# Patient Record
Sex: FEMALE | Race: WHITE | NOT HISPANIC OR LATINO | Employment: UNEMPLOYED | URBAN - NONMETROPOLITAN AREA
[De-identification: names, ages, dates, MRNs, and addresses within clinical notes are randomized per-mention and may not be internally consistent; named-entity substitution may affect disease eponyms.]

---

## 2021-05-01 ENCOUNTER — APPOINTMENT (OUTPATIENT)
Dept: CT IMAGING | Facility: HOSPITAL | Age: 36
End: 2021-05-01

## 2021-05-01 ENCOUNTER — HOSPITAL ENCOUNTER (EMERGENCY)
Facility: HOSPITAL | Age: 36
Discharge: HOME | End: 2021-05-01
Attending: EMERGENCY MEDICINE | Admitting: EMERGENCY MEDICINE

## 2021-05-01 VITALS
DIASTOLIC BLOOD PRESSURE: 58 MMHG | BODY MASS INDEX: 25.2 KG/M2 | SYSTOLIC BLOOD PRESSURE: 91 MMHG | RESPIRATION RATE: 18 BRPM | HEIGHT: 59 IN | OXYGEN SATURATION: 100 % | TEMPERATURE: 98.6 F | WEIGHT: 125 LBS | HEART RATE: 70 BPM

## 2021-05-01 DIAGNOSIS — R10.9 ABDOMINAL PAIN, UNSPECIFIED ABDOMINAL LOCATION: ICD-10-CM

## 2021-05-01 DIAGNOSIS — N39.0 URINARY TRACT INFECTION WITHOUT HEMATURIA, SITE UNSPECIFIED: Primary | ICD-10-CM

## 2021-05-01 LAB
ALBUMIN SERPL-MCNC: 4.1 G/DL (ref 3.5–5.2)
ALBUMIN/GLOB SERPL: 1.2 G/DL
ALP SERPL-CCNC: 71 U/L (ref 39–117)
ALT SERPL W P-5'-P-CCNC: 54 U/L (ref 1–33)
ANION GAP SERPL CALCULATED.3IONS-SCNC: 11.5 MMOL/L (ref 5–15)
AST SERPL-CCNC: 40 U/L (ref 1–32)
B-HCG UR QL: NEGATIVE
BACTERIA UR QL AUTO: ABNORMAL /HPF
BASOPHILS # BLD AUTO: 0.04 10*3/MM3 (ref 0–0.2)
BASOPHILS NFR BLD AUTO: 0.5 % (ref 0–1.5)
BILIRUB SERPL-MCNC: 0.2 MG/DL (ref 0–1.2)
BILIRUB UR QL STRIP: NEGATIVE
BUN SERPL-MCNC: 13 MG/DL (ref 6–20)
BUN/CREAT SERPL: 16.3 (ref 7–25)
CALCIUM SPEC-SCNC: 9.2 MG/DL (ref 8.6–10.5)
CHLORIDE SERPL-SCNC: 101 MMOL/L (ref 98–107)
CLARITY UR: ABNORMAL
CO2 SERPL-SCNC: 25.5 MMOL/L (ref 22–29)
COLOR UR: ABNORMAL
CREAT SERPL-MCNC: 0.8 MG/DL (ref 0.57–1)
D-LACTATE SERPL-SCNC: 1.3 MMOL/L (ref 0.5–2)
DEPRECATED RDW RBC AUTO: 38.6 FL (ref 37–54)
EOSINOPHIL # BLD AUTO: 0.07 10*3/MM3 (ref 0–0.4)
EOSINOPHIL NFR BLD AUTO: 0.8 % (ref 0.3–6.2)
ERYTHROCYTE [DISTWIDTH] IN BLOOD BY AUTOMATED COUNT: 11.7 % (ref 12.3–15.4)
GFR SERPL CREATININE-BSD FRML MDRD: 82 ML/MIN/1.73
GLOBULIN UR ELPH-MCNC: 3.4 GM/DL
GLUCOSE SERPL-MCNC: 115 MG/DL (ref 65–99)
GLUCOSE UR STRIP-MCNC: NEGATIVE MG/DL
HCT VFR BLD AUTO: 42.5 % (ref 34–46.6)
HGB BLD-MCNC: 14.2 G/DL (ref 12–15.9)
HGB UR QL STRIP.AUTO: ABNORMAL
IMM GRANULOCYTES # BLD AUTO: 0.02 10*3/MM3 (ref 0–0.05)
IMM GRANULOCYTES NFR BLD AUTO: 0.2 % (ref 0–0.5)
KETONES UR QL STRIP: ABNORMAL
LEUKOCYTE ESTERASE UR QL STRIP.AUTO: ABNORMAL
LIPASE SERPL-CCNC: 37 U/L (ref 13–60)
LYMPHOCYTES # BLD AUTO: 1.2 10*3/MM3 (ref 0.7–3.1)
LYMPHOCYTES NFR BLD AUTO: 13.9 % (ref 19.6–45.3)
MCH RBC QN AUTO: 30.1 PG (ref 26.6–33)
MCHC RBC AUTO-ENTMCNC: 33.4 G/DL (ref 31.5–35.7)
MCV RBC AUTO: 90.2 FL (ref 79–97)
MONOCYTES # BLD AUTO: 0.56 10*3/MM3 (ref 0.1–0.9)
MONOCYTES NFR BLD AUTO: 6.5 % (ref 5–12)
NEUTROPHILS NFR BLD AUTO: 6.75 10*3/MM3 (ref 1.7–7)
NEUTROPHILS NFR BLD AUTO: 78.1 % (ref 42.7–76)
NITRITE UR QL STRIP: NEGATIVE
NRBC BLD AUTO-RTO: 0 /100 WBC (ref 0–0.2)
PH UR STRIP.AUTO: 6.5 [PH] (ref 5–8)
PLATELET # BLD AUTO: 263 10*3/MM3 (ref 140–450)
PMV BLD AUTO: 9.9 FL (ref 6–12)
POTASSIUM SERPL-SCNC: 4.5 MMOL/L (ref 3.5–5.2)
PROCALCITONIN SERPL-MCNC: 0.04 NG/ML (ref 0–0.25)
PROT SERPL-MCNC: 7.5 G/DL (ref 6–8.5)
PROT UR QL STRIP: ABNORMAL
RBC # BLD AUTO: 4.71 10*6/MM3 (ref 3.77–5.28)
RBC # UR: ABNORMAL /HPF
REF LAB TEST METHOD: ABNORMAL
SODIUM SERPL-SCNC: 138 MMOL/L (ref 136–145)
SP GR UR STRIP: 1.03 (ref 1–1.03)
SQUAMOUS #/AREA URNS HPF: ABNORMAL /HPF
UROBILINOGEN UR QL STRIP: ABNORMAL
WBC # BLD AUTO: 8.64 10*3/MM3 (ref 3.4–10.8)
WBC UR QL AUTO: ABNORMAL /HPF

## 2021-05-01 PROCEDURE — 25010000002 MORPHINE PER 10 MG: Performed by: EMERGENCY MEDICINE

## 2021-05-01 PROCEDURE — 25010000002 KETOROLAC TROMETHAMINE PER 15 MG: Performed by: EMERGENCY MEDICINE

## 2021-05-01 PROCEDURE — 96365 THER/PROPH/DIAG IV INF INIT: CPT

## 2021-05-01 PROCEDURE — 99284 EMERGENCY DEPT VISIT MOD MDM: CPT

## 2021-05-01 PROCEDURE — 87086 URINE CULTURE/COLONY COUNT: CPT | Performed by: EMERGENCY MEDICINE

## 2021-05-01 PROCEDURE — 25010000002 IOPAMIDOL 61 % SOLUTION: Performed by: EMERGENCY MEDICINE

## 2021-05-01 PROCEDURE — 25010000002 CEFTRIAXONE SODIUM-DEXTROSE 1-3.74 GM-%(50ML) RECONSTITUTED SOLUTION: Performed by: EMERGENCY MEDICINE

## 2021-05-01 PROCEDURE — 85025 COMPLETE CBC W/AUTO DIFF WBC: CPT | Performed by: EMERGENCY MEDICINE

## 2021-05-01 PROCEDURE — 83690 ASSAY OF LIPASE: CPT | Performed by: EMERGENCY MEDICINE

## 2021-05-01 PROCEDURE — 81025 URINE PREGNANCY TEST: CPT | Performed by: EMERGENCY MEDICINE

## 2021-05-01 PROCEDURE — 74177 CT ABD & PELVIS W/CONTRAST: CPT

## 2021-05-01 PROCEDURE — 80053 COMPREHEN METABOLIC PANEL: CPT | Performed by: EMERGENCY MEDICINE

## 2021-05-01 PROCEDURE — 81001 URINALYSIS AUTO W/SCOPE: CPT | Performed by: EMERGENCY MEDICINE

## 2021-05-01 PROCEDURE — 25010000002 ONDANSETRON PER 1 MG: Performed by: EMERGENCY MEDICINE

## 2021-05-01 PROCEDURE — 84145 PROCALCITONIN (PCT): CPT | Performed by: EMERGENCY MEDICINE

## 2021-05-01 PROCEDURE — 96375 TX/PRO/DX INJ NEW DRUG ADDON: CPT

## 2021-05-01 PROCEDURE — 83605 ASSAY OF LACTIC ACID: CPT | Performed by: EMERGENCY MEDICINE

## 2021-05-01 PROCEDURE — 96376 TX/PRO/DX INJ SAME DRUG ADON: CPT

## 2021-05-01 RX ORDER — KETOROLAC TROMETHAMINE 30 MG/ML
10 INJECTION, SOLUTION INTRAMUSCULAR; INTRAVENOUS ONCE
Status: COMPLETED | OUTPATIENT
Start: 2021-05-01 | End: 2021-05-01

## 2021-05-01 RX ORDER — MORPHINE SULFATE 4 MG/ML
4 INJECTION, SOLUTION INTRAMUSCULAR; INTRAVENOUS ONCE
Status: COMPLETED | OUTPATIENT
Start: 2021-05-01 | End: 2021-05-01

## 2021-05-01 RX ORDER — ONDANSETRON 2 MG/ML
4 INJECTION INTRAMUSCULAR; INTRAVENOUS ONCE
Status: COMPLETED | OUTPATIENT
Start: 2021-05-01 | End: 2021-05-01

## 2021-05-01 RX ORDER — PHENAZOPYRIDINE HYDROCHLORIDE 100 MG/1
100 TABLET, FILM COATED ORAL 3 TIMES DAILY PRN
Qty: 6 TABLET | Refills: 0 | Status: SHIPPED | OUTPATIENT
Start: 2021-05-01

## 2021-05-01 RX ORDER — CEFUROXIME AXETIL 500 MG/1
500 TABLET ORAL 2 TIMES DAILY
Qty: 20 TABLET | Refills: 0 | OUTPATIENT
Start: 2021-05-01 | End: 2022-06-26

## 2021-05-01 RX ORDER — KETOROLAC TROMETHAMINE 30 MG/ML
30 INJECTION, SOLUTION INTRAMUSCULAR; INTRAVENOUS ONCE
Status: COMPLETED | OUTPATIENT
Start: 2021-05-01 | End: 2021-05-01

## 2021-05-01 RX ORDER — CEFTRIAXONE 1 G/50ML
1 INJECTION, SOLUTION INTRAVENOUS ONCE
Status: COMPLETED | OUTPATIENT
Start: 2021-05-01 | End: 2021-05-01

## 2021-05-01 RX ORDER — PHENAZOPYRIDINE HYDROCHLORIDE 100 MG/1
200 TABLET, FILM COATED ORAL ONCE
Status: COMPLETED | OUTPATIENT
Start: 2021-05-01 | End: 2021-05-01

## 2021-05-01 RX ADMIN — SODIUM CHLORIDE 1000 ML: 9 INJECTION, SOLUTION INTRAVENOUS at 06:51

## 2021-05-01 RX ADMIN — KETOROLAC TROMETHAMINE 30 MG: 30 INJECTION, SOLUTION INTRAMUSCULAR; INTRAVENOUS at 07:02

## 2021-05-01 RX ADMIN — MORPHINE SULFATE 4 MG: 4 INJECTION, SOLUTION INTRAMUSCULAR; INTRAVENOUS at 10:19

## 2021-05-01 RX ADMIN — CEFTRIAXONE 1 G: 1 INJECTION, SOLUTION INTRAVENOUS at 09:37

## 2021-05-01 RX ADMIN — KETOROLAC TROMETHAMINE 10 MG: 30 INJECTION, SOLUTION INTRAMUSCULAR; INTRAVENOUS at 10:19

## 2021-05-01 RX ADMIN — SODIUM CHLORIDE 1000 ML: 9 INJECTION, SOLUTION INTRAVENOUS at 10:20

## 2021-05-01 RX ADMIN — ONDANSETRON 4 MG: 2 INJECTION INTRAMUSCULAR; INTRAVENOUS at 07:01

## 2021-05-01 RX ADMIN — PHENAZOPYRIDINE HYDROCHLORIDE 200 MG: 100 TABLET ORAL at 12:09

## 2021-05-01 RX ADMIN — IOPAMIDOL 80 ML: 612 INJECTION, SOLUTION INTRAVENOUS at 08:26

## 2021-05-01 RX ADMIN — SODIUM CHLORIDE 1000 ML: 9 INJECTION, SOLUTION INTRAVENOUS at 09:34

## 2021-05-01 NOTE — DISCHARGE INSTRUCTIONS
You have evidence of a urinary tract infection.  Your symptoms should begin to improve within 24 hours with antibiotics.  If your symptoms are worsening, your pain is worsening, you develop new symptoms in the next 24 hours I would recommend returning to the emergency department to be evaluated again.

## 2021-05-02 LAB — BACTERIA SPEC AEROBE CULT: NORMAL

## 2021-08-11 ENCOUNTER — HOSPITAL ENCOUNTER (EMERGENCY)
Facility: HOSPITAL | Age: 36
Discharge: HOME | End: 2021-08-11
Attending: EMERGENCY MEDICINE | Admitting: EMERGENCY MEDICINE

## 2021-08-11 VITALS
RESPIRATION RATE: 16 BRPM | SYSTOLIC BLOOD PRESSURE: 123 MMHG | HEIGHT: 59 IN | WEIGHT: 111.6 LBS | BODY MASS INDEX: 22.5 KG/M2 | OXYGEN SATURATION: 99 % | TEMPERATURE: 98.1 F | DIASTOLIC BLOOD PRESSURE: 85 MMHG | HEART RATE: 104 BPM

## 2021-08-11 DIAGNOSIS — Z20.822 EXPOSURE TO COVID-19 VIRUS: Primary | ICD-10-CM

## 2021-08-11 DIAGNOSIS — Z20.822 COVID-19 VIRUS NOT DETECTED: ICD-10-CM

## 2021-08-11 LAB — SARS-COV-2 RNA PNL SPEC NAA+PROBE: NOT DETECTED

## 2021-08-11 PROCEDURE — C9803 HOPD COVID-19 SPEC COLLECT: HCPCS

## 2021-08-11 PROCEDURE — 87635 SARS-COV-2 COVID-19 AMP PRB: CPT | Performed by: EMERGENCY MEDICINE

## 2021-08-11 PROCEDURE — 99283 EMERGENCY DEPT VISIT LOW MDM: CPT

## 2021-08-11 NOTE — ED PROVIDER NOTES
"Subjective   36-year-old female here \"to be tested for the raudel\".  She states that she has been sick for 1 week with a cough.  No other symptoms.  Sick contact who just tested positive for Covid tonight.          Review of Systems   Constitutional: Negative.    HENT: Negative.    Eyes: Negative.    Respiratory: Positive for cough.    Cardiovascular: Negative.    Gastrointestinal: Negative.    Genitourinary: Negative.    Musculoskeletal: Negative.    Skin: Negative.    Neurological: Negative.    Psychiatric/Behavioral: Negative.        History reviewed. No pertinent past medical history.    No Known Allergies    History reviewed. No pertinent surgical history.    History reviewed. No pertinent family history.    Social History     Socioeconomic History   • Marital status: Single     Spouse name: Not on file   • Number of children: Not on file   • Years of education: Not on file   • Highest education level: Not on file   Tobacco Use   • Smoking status: Current Every Day Smoker   Substance and Sexual Activity   • Alcohol use: Not Currently   • Drug use: Not Currently   • Sexual activity: Defer           Objective   Physical Exam  Constitutional:       General: She is not in acute distress.     Appearance: Normal appearance. She is not ill-appearing, toxic-appearing or diaphoretic.   HENT:      Head: Normocephalic and atraumatic.      Right Ear: External ear normal.      Left Ear: External ear normal.      Nose: Nose normal.   Eyes:      Extraocular Movements: Extraocular movements intact.      Conjunctiva/sclera: Conjunctivae normal.      Pupils: Pupils are equal, round, and reactive to light.   Cardiovascular:      Rate and Rhythm: Normal rate and regular rhythm.      Pulses: Normal pulses.      Heart sounds: Normal heart sounds.   Pulmonary:      Effort: Pulmonary effort is normal. No respiratory distress.      Breath sounds: Normal breath sounds.   Musculoskeletal:         General: No swelling, tenderness or " deformity. Normal range of motion.      Cervical back: Normal range of motion.   Skin:     General: Skin is warm and dry.      Capillary Refill: Capillary refill takes less than 2 seconds.      Findings: No rash.   Neurological:      General: No focal deficit present.      Mental Status: She is alert and oriented to person, place, and time.   Psychiatric:         Mood and Affect: Mood normal.         Behavior: Behavior normal.         Procedures           ED Course                                           MDM  Number of Diagnoses or Management Options  COVID-19 virus not detected  Exposure to COVID-19 virus  Diagnosis management comments: 36-year-old female with cough, exposure to Covid.  Well-developed, well-nourished female in no distress with exam as above.  Her vital signs are normal.  Her exam is nonfocal.  Will test for Covid.  Disposition is likely to home.    DDx: COVID-19, viral illness    Covid swab is negative.  Interestingly her son's test is positive.  Nevertheless will discharge home with supportive measures and outpatient follow-up.      Final diagnoses:   Exposure to COVID-19 virus   COVID-19 virus not detected        Roland Vasquez MD  08/11/21 6784

## 2021-08-23 ENCOUNTER — HOSPITAL ENCOUNTER (EMERGENCY)
Facility: HOSPITAL | Age: 36
Discharge: LEFT BEFORE DISCHARGE | End: 2021-08-24

## 2021-08-23 VITALS
RESPIRATION RATE: 14 BRPM | DIASTOLIC BLOOD PRESSURE: 80 MMHG | WEIGHT: 111.6 LBS | SYSTOLIC BLOOD PRESSURE: 118 MMHG | HEIGHT: 59 IN | HEART RATE: 102 BPM | TEMPERATURE: 97.9 F | BODY MASS INDEX: 22.5 KG/M2 | OXYGEN SATURATION: 100 %

## 2021-08-23 PROCEDURE — 99211 OFF/OP EST MAY X REQ PHY/QHP: CPT

## 2021-09-01 ENCOUNTER — TRANSCRIBE ORDERS (OUTPATIENT)
Dept: ADMINISTRATIVE | Facility: HOSPITAL | Age: 36
End: 2021-09-01

## 2021-09-01 DIAGNOSIS — Z86.39 HISTORY OF THYROID NODULE: ICD-10-CM

## 2021-09-01 DIAGNOSIS — R94.6 ABNORMAL THYROID EXAM: Primary | ICD-10-CM

## 2022-05-19 ENCOUNTER — HOSPITAL ENCOUNTER (EMERGENCY)
Facility: HOSPITAL | Age: 37
Discharge: HOME | End: 2022-05-20
Attending: EMERGENCY MEDICINE | Admitting: EMERGENCY MEDICINE

## 2022-05-19 DIAGNOSIS — N39.0 URINARY TRACT INFECTION WITHOUT HEMATURIA, SITE UNSPECIFIED: Primary | ICD-10-CM

## 2022-05-19 DIAGNOSIS — R50.9 ACUTE FEBRILE ILLNESS: ICD-10-CM

## 2022-05-19 PROCEDURE — 99283 EMERGENCY DEPT VISIT LOW MDM: CPT

## 2022-05-19 PROCEDURE — 0202U NFCT DS 22 TRGT SARS-COV-2: CPT | Performed by: EMERGENCY MEDICINE

## 2022-05-19 PROCEDURE — 96374 THER/PROPH/DIAG INJ IV PUSH: CPT

## 2022-05-19 PROCEDURE — P9612 CATHETERIZE FOR URINE SPEC: HCPCS

## 2022-05-20 ENCOUNTER — APPOINTMENT (OUTPATIENT)
Dept: CT IMAGING | Facility: HOSPITAL | Age: 37
End: 2022-05-20

## 2022-05-20 ENCOUNTER — APPOINTMENT (OUTPATIENT)
Dept: GENERAL RADIOLOGY | Facility: HOSPITAL | Age: 37
End: 2022-05-20

## 2022-05-20 VITALS
BODY MASS INDEX: 23.18 KG/M2 | SYSTOLIC BLOOD PRESSURE: 131 MMHG | OXYGEN SATURATION: 98 % | HEIGHT: 59 IN | WEIGHT: 115 LBS | TEMPERATURE: 99.1 F | RESPIRATION RATE: 16 BRPM | DIASTOLIC BLOOD PRESSURE: 85 MMHG | HEART RATE: 110 BPM

## 2022-05-20 LAB
ALBUMIN SERPL-MCNC: 3.2 G/DL (ref 3.5–5.2)
ALBUMIN/GLOB SERPL: 1 G/DL
ALP SERPL-CCNC: 90 U/L (ref 39–117)
ALT SERPL W P-5'-P-CCNC: 43 U/L (ref 1–33)
AMPHET+METHAMPHET UR QL: POSITIVE
AMPHETAMINES UR QL: POSITIVE
ANION GAP SERPL CALCULATED.3IONS-SCNC: 10.3 MMOL/L (ref 5–15)
AST SERPL-CCNC: 27 U/L (ref 1–32)
B PARAPERT DNA SPEC QL NAA+PROBE: NOT DETECTED
B PERT DNA SPEC QL NAA+PROBE: NOT DETECTED
B-HCG UR QL: NEGATIVE
BACTERIA BLD CULT: ABNORMAL
BACTERIA UR QL AUTO: ABNORMAL /HPF
BARBITURATES UR QL SCN: NEGATIVE
BASOPHILS # BLD AUTO: 0.02 10*3/MM3 (ref 0–0.2)
BASOPHILS NFR BLD AUTO: 0.2 % (ref 0–1.5)
BENZODIAZ UR QL SCN: NEGATIVE
BILIRUB SERPL-MCNC: 0.3 MG/DL (ref 0–1.2)
BILIRUB UR QL STRIP: NEGATIVE
BUN SERPL-MCNC: 8 MG/DL (ref 6–20)
BUN/CREAT SERPL: 13.3 (ref 7–25)
BUPRENORPHINE SERPL-MCNC: POSITIVE NG/ML
C PNEUM DNA NPH QL NAA+NON-PROBE: NOT DETECTED
CALCIUM SPEC-SCNC: 7.9 MG/DL (ref 8.6–10.5)
CANNABINOIDS SERPL QL: NEGATIVE
CHLORIDE SERPL-SCNC: 100 MMOL/L (ref 98–107)
CLARITY UR: ABNORMAL
CO2 SERPL-SCNC: 22.7 MMOL/L (ref 22–29)
COCAINE UR QL: NEGATIVE
COLOR UR: YELLOW
CREAT SERPL-MCNC: 0.6 MG/DL (ref 0.57–1)
D-LACTATE SERPL-SCNC: 1.5 MMOL/L (ref 0.5–2)
DEPRECATED RDW RBC AUTO: 39.6 FL (ref 37–54)
EGFRCR SERPLBLD CKD-EPI 2021: 119.5 ML/MIN/1.73
EOSINOPHIL # BLD AUTO: 0.01 10*3/MM3 (ref 0–0.4)
EOSINOPHIL NFR BLD AUTO: 0.1 % (ref 0.3–6.2)
ERYTHROCYTE [DISTWIDTH] IN BLOOD BY AUTOMATED COUNT: 12.2 % (ref 12.3–15.4)
FLUAV SUBTYP SPEC NAA+PROBE: NOT DETECTED
FLUBV RNA ISLT QL NAA+PROBE: NOT DETECTED
GLOBULIN UR ELPH-MCNC: 3.2 GM/DL
GLUCOSE SERPL-MCNC: 128 MG/DL (ref 65–99)
GLUCOSE UR STRIP-MCNC: NEGATIVE MG/DL
HADV DNA SPEC NAA+PROBE: NOT DETECTED
HCOV 229E RNA SPEC QL NAA+PROBE: NOT DETECTED
HCOV HKU1 RNA SPEC QL NAA+PROBE: NOT DETECTED
HCOV NL63 RNA SPEC QL NAA+PROBE: NOT DETECTED
HCOV OC43 RNA SPEC QL NAA+PROBE: NOT DETECTED
HCT VFR BLD AUTO: 37 % (ref 34–46.6)
HGB BLD-MCNC: 13 G/DL (ref 12–15.9)
HGB UR QL STRIP.AUTO: ABNORMAL
HMPV RNA NPH QL NAA+NON-PROBE: NOT DETECTED
HPIV1 RNA ISLT QL NAA+PROBE: NOT DETECTED
HPIV2 RNA SPEC QL NAA+PROBE: NOT DETECTED
HPIV3 RNA NPH QL NAA+PROBE: NOT DETECTED
HPIV4 P GENE NPH QL NAA+PROBE: NOT DETECTED
HYALINE CASTS UR QL AUTO: ABNORMAL /LPF
IMM GRANULOCYTES # BLD AUTO: 0.04 10*3/MM3 (ref 0–0.05)
IMM GRANULOCYTES NFR BLD AUTO: 0.4 % (ref 0–0.5)
KETONES UR QL STRIP: NEGATIVE
LEUKOCYTE ESTERASE UR QL STRIP.AUTO: ABNORMAL
LYMPHOCYTES # BLD AUTO: 0.87 10*3/MM3 (ref 0.7–3.1)
LYMPHOCYTES NFR BLD AUTO: 7.9 % (ref 19.6–45.3)
M PNEUMO IGG SER IA-ACNC: NOT DETECTED
MAGNESIUM SERPL-MCNC: 1.6 MG/DL (ref 1.6–2.6)
MCH RBC QN AUTO: 31.2 PG (ref 26.6–33)
MCHC RBC AUTO-ENTMCNC: 35.1 G/DL (ref 31.5–35.7)
MCV RBC AUTO: 88.7 FL (ref 79–97)
METHADONE UR QL SCN: NEGATIVE
MONOCYTES # BLD AUTO: 1.05 10*3/MM3 (ref 0.1–0.9)
MONOCYTES NFR BLD AUTO: 9.6 % (ref 5–12)
NEUTROPHILS NFR BLD AUTO: 8.99 10*3/MM3 (ref 1.7–7)
NEUTROPHILS NFR BLD AUTO: 81.8 % (ref 42.7–76)
NITRITE UR QL STRIP: POSITIVE
NRBC BLD AUTO-RTO: 0 /100 WBC (ref 0–0.2)
OPIATES UR QL: NEGATIVE
OXYCODONE UR QL SCN: NEGATIVE
PCP UR QL SCN: NEGATIVE
PH UR STRIP.AUTO: 6.5 [PH] (ref 5–8)
PLATELET # BLD AUTO: 182 10*3/MM3 (ref 140–450)
PMV BLD AUTO: 10 FL (ref 6–12)
POTASSIUM SERPL-SCNC: 2.8 MMOL/L (ref 3.5–5.2)
PROCALCITONIN SERPL-MCNC: 0.29 NG/ML (ref 0–0.25)
PROPOXYPH UR QL: NEGATIVE
PROT SERPL-MCNC: 6.4 G/DL (ref 6–8.5)
PROT UR QL STRIP: ABNORMAL
RBC # BLD AUTO: 4.17 10*6/MM3 (ref 3.77–5.28)
RBC # UR STRIP: ABNORMAL /HPF
REF LAB TEST METHOD: ABNORMAL
RHINOVIRUS RNA SPEC NAA+PROBE: NOT DETECTED
RSV RNA NPH QL NAA+NON-PROBE: NOT DETECTED
SARS-COV-2 RNA NPH QL NAA+NON-PROBE: NOT DETECTED
SODIUM SERPL-SCNC: 133 MMOL/L (ref 136–145)
SP GR UR STRIP: 1.01 (ref 1–1.03)
SQUAMOUS #/AREA URNS HPF: ABNORMAL /HPF
TRICYCLICS UR QL SCN: NEGATIVE
UROBILINOGEN UR QL STRIP: ABNORMAL
WBC # UR STRIP: ABNORMAL /HPF
WBC NRBC COR # BLD: 10.98 10*3/MM3 (ref 3.4–10.8)

## 2022-05-20 PROCEDURE — 83605 ASSAY OF LACTIC ACID: CPT | Performed by: NURSE PRACTITIONER

## 2022-05-20 PROCEDURE — 87186 SC STD MICRODIL/AGAR DIL: CPT | Performed by: EMERGENCY MEDICINE

## 2022-05-20 PROCEDURE — 83735 ASSAY OF MAGNESIUM: CPT | Performed by: EMERGENCY MEDICINE

## 2022-05-20 PROCEDURE — 81025 URINE PREGNANCY TEST: CPT | Performed by: NURSE PRACTITIONER

## 2022-05-20 PROCEDURE — 96374 THER/PROPH/DIAG INJ IV PUSH: CPT

## 2022-05-20 PROCEDURE — 80306 DRUG TEST PRSMV INSTRMNT: CPT | Performed by: EMERGENCY MEDICINE

## 2022-05-20 PROCEDURE — 87150 DNA/RNA AMPLIFIED PROBE: CPT | Performed by: EMERGENCY MEDICINE

## 2022-05-20 PROCEDURE — 25010000002 KETOROLAC TROMETHAMINE PER 15 MG: Performed by: EMERGENCY MEDICINE

## 2022-05-20 PROCEDURE — 80053 COMPREHEN METABOLIC PANEL: CPT | Performed by: NURSE PRACTITIONER

## 2022-05-20 PROCEDURE — 85025 COMPLETE CBC W/AUTO DIFF WBC: CPT | Performed by: NURSE PRACTITIONER

## 2022-05-20 PROCEDURE — 71045 X-RAY EXAM CHEST 1 VIEW: CPT

## 2022-05-20 PROCEDURE — 81001 URINALYSIS AUTO W/SCOPE: CPT | Performed by: NURSE PRACTITIONER

## 2022-05-20 PROCEDURE — 87040 BLOOD CULTURE FOR BACTERIA: CPT | Performed by: EMERGENCY MEDICINE

## 2022-05-20 PROCEDURE — 84145 PROCALCITONIN (PCT): CPT | Performed by: NURSE PRACTITIONER

## 2022-05-20 RX ORDER — CEFTRIAXONE 1 G/50ML
1 INJECTION, SOLUTION INTRAVENOUS ONCE
Status: DISCONTINUED | OUTPATIENT
Start: 2022-05-20 | End: 2022-05-20

## 2022-05-20 RX ORDER — CEFUROXIME AXETIL 250 MG/1
500 TABLET ORAL ONCE
Status: COMPLETED | OUTPATIENT
Start: 2022-05-20 | End: 2022-05-20

## 2022-05-20 RX ORDER — CEFUROXIME AXETIL 500 MG/1
500 TABLET ORAL 2 TIMES DAILY
Qty: 20 TABLET | Refills: 0 | OUTPATIENT
Start: 2022-05-20 | End: 2022-06-26

## 2022-05-20 RX ORDER — ACETAMINOPHEN 325 MG/1
975 TABLET ORAL ONCE
Status: CANCELLED | OUTPATIENT
Start: 2022-05-20 | End: 2022-05-20

## 2022-05-20 RX ORDER — SODIUM CHLORIDE 0.9 % (FLUSH) 0.9 %
10 SYRINGE (ML) INJECTION AS NEEDED
Status: DISCONTINUED | OUTPATIENT
Start: 2022-05-20 | End: 2022-05-20 | Stop reason: HOSPADM

## 2022-05-20 RX ORDER — KETOROLAC TROMETHAMINE 30 MG/ML
10 INJECTION, SOLUTION INTRAMUSCULAR; INTRAVENOUS ONCE
Status: COMPLETED | OUTPATIENT
Start: 2022-05-20 | End: 2022-05-20

## 2022-05-20 RX ORDER — ACETAMINOPHEN 325 MG/1
650 TABLET ORAL ONCE
Status: COMPLETED | OUTPATIENT
Start: 2022-05-20 | End: 2022-05-20

## 2022-05-20 RX ORDER — POTASSIUM CHLORIDE 7.45 MG/ML
10 INJECTION INTRAVENOUS ONCE
Status: DISCONTINUED | OUTPATIENT
Start: 2022-05-20 | End: 2022-05-20 | Stop reason: HOSPADM

## 2022-05-20 RX ORDER — POTASSIUM CHLORIDE 750 MG/1
20 CAPSULE, EXTENDED RELEASE ORAL ONCE
Status: COMPLETED | OUTPATIENT
Start: 2022-05-20 | End: 2022-05-20

## 2022-05-20 RX ADMIN — SODIUM CHLORIDE 1000 ML: 9 INJECTION, SOLUTION INTRAVENOUS at 00:38

## 2022-05-20 RX ADMIN — CEFUROXIME AXETIL 500 MG: 250 TABLET ORAL at 03:19

## 2022-05-20 RX ADMIN — POTASSIUM CHLORIDE 20 MEQ: 10 CAPSULE, COATED, EXTENDED RELEASE ORAL at 03:05

## 2022-05-20 RX ADMIN — KETOROLAC TROMETHAMINE 10 MG: 30 INJECTION, SOLUTION INTRAMUSCULAR at 03:05

## 2022-05-20 RX ADMIN — ACETAMINOPHEN 650 MG: 325 TABLET ORAL at 00:38

## 2022-05-20 NOTE — ED PROVIDER NOTES
Subjective   Chief Complaint: Flulike symptoms    History of Present Illness: This is a 36-year-old female patient comes into the ED complaining of chills, fever, headache, body aches, and symptoms of urinary tract infection.  Patient states that she thought she had a urinary tract infection approximately 3 days ago due to the fact that she had pain in her back and burning with urination but did not seek treatment at that time.  Patient states that her symptoms have worsened and over the last 24 hours she is started having body aches, rapid heart rate, fevers and chills.    Nurses Notes reviewed and agree, including vitals, allergies, social history and prior medical history.                Review of Systems   Constitutional: Positive for chills, fatigue and fever.   Genitourinary: Positive for difficulty urinating, dysuria and flank pain.       No past medical history on file.    No Known Allergies    No past surgical history on file.    No family history on file.    Social History     Socioeconomic History   • Marital status: Single   Tobacco Use   • Smoking status: Current Every Day Smoker     Packs/day: 0.25     Types: Cigarettes   Substance and Sexual Activity   • Alcohol use: Not Currently   • Drug use: Not Currently   • Sexual activity: Defer           Objective   Physical Exam  Vitals and nursing note reviewed.   Constitutional:       Appearance: She is ill-appearing.   HENT:      Head: Normocephalic and atraumatic.   Eyes:      Extraocular Movements: Extraocular movements intact.      Pupils: Pupils are equal, round, and reactive to light.   Cardiovascular:      Rate and Rhythm: Regular rhythm. Tachycardia present.      Pulses: Normal pulses.      Heart sounds: Normal heart sounds.   Pulmonary:      Effort: Pulmonary effort is normal.      Breath sounds: Normal breath sounds.   Abdominal:      General: Abdomen is flat. Bowel sounds are normal.      Palpations: Abdomen is soft.   Musculoskeletal:       Cervical back: Normal range of motion and neck supple.   Skin:     Capillary Refill: Capillary refill takes less than 2 seconds.   Neurological:      General: No focal deficit present.      Mental Status: She is alert and oriented to person, place, and time. Mental status is at baseline.      GCS: GCS eye subscore is 4. GCS verbal subscore is 5. GCS motor subscore is 6.      Sensory: Sensation is intact.      Motor: Motor function is intact.      Gait: Gait is intact.   Psychiatric:         Attention and Perception: Attention and perception normal.         Mood and Affect: Mood and affect normal.         Speech: Speech normal.         Behavior: Behavior normal. Behavior is cooperative.         Procedures           ED Course                                                 MDM    Final diagnoses:   Urinary tract infection without hematuria, site unspecified   Acute febrile illness       ED Disposition  ED Disposition     ED Disposition   Discharge    Condition   Stable    Comment   --             [address] [place] [address] [phone]  Schedule an appointment as soon as possible for a visit in 3 days      [facility] Emergency Department  [address] [place] [address] [phone]    If symptoms worsen         Medication List      Changed    * cefuroxime 500 MG tablet  Commonly known as: CEFTIN  Take 1 tablet by mouth 2 (Two) Times a Day.  What changed: Another medication with the same name was added. Make sure you understand how and when to take each.     * cefuroxime 500 MG tablet  Commonly known as: CEFTIN  Take 1 tablet by mouth 2 (Two) Times a Day.  What changed: You were already taking a medication with the same name, and this prescription was added. Make sure you understand how and when to take each.         * This list has 2 medication(s) that are the same as other medications prescribed for you. Read the directions carefully,  and ask your doctor or other care provider to review them with you.               Where to Get Your Medications      These medications were sent to [id number] - [place], [place] - [address] AT [facility] [address] - [phone] PH - [phone] FX  [address], [place] [address]    Phone: [phone]   · cefuroxime 500 MG tablet             I received patient in signout from previous provider.  Awaiting labs at that time.  On chest x-ray I do not appreciate an obvious infiltrate.  Blood cultures were drawn.  Patient had normal lactic acid.  Procalcitonin mildly elevated at 0.29.  Patient does have some evidence of a urinary tract infection.  Patient was also found to be hypokalemic.  Ordered oral and intravenous potassium as replacement.  I recommended imaging studies for further evaluation of patient's fever, especially given history of drug use and patient could have serious infection.  Patient did not wish to wait longer and requested antibiotics and discharged home.  She did not want CT scan, she did not want intravenous potassium or intravenous antibiotics at this time.  We did provide oral antibiotics and a prescription.  Advised patient that if she had any worsening of symptoms to return at any time to complete her work-up.     Mago Garcia MD  05/20/22 1589

## 2022-05-21 ENCOUNTER — TELEPHONE (OUTPATIENT)
Dept: EMERGENCY DEPT | Facility: HOSPITAL | Age: 37
End: 2022-05-21

## 2022-05-21 NOTE — ED PROVIDER NOTES
I received an over read of the chest x-ray that was performed on 5/19/2022 that was read as multifocal pneumonia by the radiologist.  I have already attempted to contact this patient regarding blood culture with no answer, left message to return my phone call.     Mavis Singleton PA-C  05/21/22 7876

## 2022-05-22 ENCOUNTER — APPOINTMENT (OUTPATIENT)
Dept: CT IMAGING | Facility: HOSPITAL | Age: 37
End: 2022-05-22

## 2022-05-22 ENCOUNTER — TELEPHONE (OUTPATIENT)
Dept: EMERGENCY DEPT | Facility: HOSPITAL | Age: 37
End: 2022-05-22

## 2022-05-22 ENCOUNTER — HOSPITAL ENCOUNTER (EMERGENCY)
Facility: HOSPITAL | Age: 37
Discharge: HOME | End: 2022-05-23
Attending: EMERGENCY MEDICINE | Admitting: EMERGENCY MEDICINE

## 2022-05-22 DIAGNOSIS — N00.9: ICD-10-CM

## 2022-05-22 DIAGNOSIS — R78.81 BACTEREMIA DUE TO ESCHERICHIA COLI: Primary | ICD-10-CM

## 2022-05-22 DIAGNOSIS — E87.6 HYPOKALEMIA: ICD-10-CM

## 2022-05-22 DIAGNOSIS — B96.20 BACTEREMIA DUE TO ESCHERICHIA COLI: Primary | ICD-10-CM

## 2022-05-22 LAB
ALBUMIN SERPL-MCNC: 3.5 G/DL (ref 3.5–5.2)
ALBUMIN/GLOB SERPL: 0.9 G/DL
ALP SERPL-CCNC: 101 U/L (ref 39–117)
ALT SERPL W P-5'-P-CCNC: 50 U/L (ref 1–33)
AMPHET+METHAMPHET UR QL: POSITIVE
AMPHETAMINES UR QL: POSITIVE
ANION GAP SERPL CALCULATED.3IONS-SCNC: 10.3 MMOL/L (ref 5–15)
AST SERPL-CCNC: 39 U/L (ref 1–32)
B-HCG UR QL: NEGATIVE
BACTERIA SPEC AEROBE CULT: ABNORMAL
BACTERIA UR QL AUTO: ABNORMAL /HPF
BARBITURATES UR QL SCN: NEGATIVE
BASOPHILS # BLD AUTO: 0.04 10*3/MM3 (ref 0–0.2)
BASOPHILS NFR BLD AUTO: 0.6 % (ref 0–1.5)
BENZODIAZ UR QL SCN: NEGATIVE
BILIRUB SERPL-MCNC: 0.2 MG/DL (ref 0–1.2)
BILIRUB UR QL STRIP: NEGATIVE
BUN SERPL-MCNC: 7 MG/DL (ref 6–20)
BUN/CREAT SERPL: 11.9 (ref 7–25)
BUPRENORPHINE SERPL-MCNC: POSITIVE NG/ML
CALCIUM SPEC-SCNC: 8.7 MG/DL (ref 8.6–10.5)
CANNABINOIDS SERPL QL: NEGATIVE
CHLORIDE SERPL-SCNC: 99 MMOL/L (ref 98–107)
CLARITY UR: ABNORMAL
CO2 SERPL-SCNC: 27.7 MMOL/L (ref 22–29)
COCAINE UR QL: NEGATIVE
COLOR UR: YELLOW
CREAT SERPL-MCNC: 0.59 MG/DL (ref 0.57–1)
D-LACTATE SERPL-SCNC: 1.7 MMOL/L (ref 0.5–2)
DEPRECATED RDW RBC AUTO: 38.1 FL (ref 37–54)
EGFRCR SERPLBLD CKD-EPI 2021: 120 ML/MIN/1.73
EOSINOPHIL # BLD AUTO: 0.22 10*3/MM3 (ref 0–0.4)
EOSINOPHIL NFR BLD AUTO: 3.5 % (ref 0.3–6.2)
ERYTHROCYTE [DISTWIDTH] IN BLOOD BY AUTOMATED COUNT: 11.9 % (ref 12.3–15.4)
GLOBULIN UR ELPH-MCNC: 3.7 GM/DL
GLUCOSE SERPL-MCNC: 94 MG/DL (ref 65–99)
GLUCOSE UR STRIP-MCNC: NEGATIVE MG/DL
GRAM STN SPEC: ABNORMAL
GRAM STN SPEC: ABNORMAL
HCT VFR BLD AUTO: 34.3 % (ref 34–46.6)
HGB BLD-MCNC: 12 G/DL (ref 12–15.9)
HGB UR QL STRIP.AUTO: ABNORMAL
HYALINE CASTS UR QL AUTO: ABNORMAL /LPF
IMM GRANULOCYTES # BLD AUTO: 0.01 10*3/MM3 (ref 0–0.05)
IMM GRANULOCYTES NFR BLD AUTO: 0.2 % (ref 0–0.5)
ISOLATED FROM: ABNORMAL
KETONES UR QL STRIP: NEGATIVE
LEUKOCYTE ESTERASE UR QL STRIP.AUTO: ABNORMAL
LIPASE SERPL-CCNC: 19 U/L (ref 13–60)
LYMPHOCYTES # BLD AUTO: 1.51 10*3/MM3 (ref 0.7–3.1)
LYMPHOCYTES NFR BLD AUTO: 24 % (ref 19.6–45.3)
MCH RBC QN AUTO: 30.8 PG (ref 26.6–33)
MCHC RBC AUTO-ENTMCNC: 35 G/DL (ref 31.5–35.7)
MCV RBC AUTO: 88.2 FL (ref 79–97)
METHADONE UR QL SCN: NEGATIVE
MONOCYTES # BLD AUTO: 0.79 10*3/MM3 (ref 0.1–0.9)
MONOCYTES NFR BLD AUTO: 12.5 % (ref 5–12)
NEUTROPHILS NFR BLD AUTO: 3.73 10*3/MM3 (ref 1.7–7)
NEUTROPHILS NFR BLD AUTO: 59.2 % (ref 42.7–76)
NITRITE UR QL STRIP: NEGATIVE
NRBC BLD AUTO-RTO: 0 /100 WBC (ref 0–0.2)
OPIATES UR QL: NEGATIVE
OXYCODONE UR QL SCN: NEGATIVE
PCP UR QL SCN: NEGATIVE
PH UR STRIP.AUTO: 6 [PH] (ref 5–8)
PLATELET # BLD AUTO: 206 10*3/MM3 (ref 140–450)
PMV BLD AUTO: 9.9 FL (ref 6–12)
POTASSIUM SERPL-SCNC: 2.8 MMOL/L (ref 3.5–5.2)
PROCALCITONIN SERPL-MCNC: 0.21 NG/ML (ref 0–0.25)
PROPOXYPH UR QL: NEGATIVE
PROT SERPL-MCNC: 7.2 G/DL (ref 6–8.5)
PROT UR QL STRIP: ABNORMAL
RBC # BLD AUTO: 3.89 10*6/MM3 (ref 3.77–5.28)
RBC # UR STRIP: ABNORMAL /HPF
REF LAB TEST METHOD: ABNORMAL
SODIUM SERPL-SCNC: 137 MMOL/L (ref 136–145)
SP GR UR STRIP: 1.02 (ref 1–1.03)
SQUAMOUS #/AREA URNS HPF: ABNORMAL /HPF
TRICYCLICS UR QL SCN: NEGATIVE
UROBILINOGEN UR QL STRIP: ABNORMAL
WBC # UR STRIP: ABNORMAL /HPF
WBC NRBC COR # BLD: 6.3 10*3/MM3 (ref 3.4–10.8)

## 2022-05-22 PROCEDURE — 85025 COMPLETE CBC W/AUTO DIFF WBC: CPT | Performed by: PHYSICIAN ASSISTANT

## 2022-05-22 PROCEDURE — 36415 COLL VENOUS BLD VENIPUNCTURE: CPT

## 2022-05-22 PROCEDURE — 96365 THER/PROPH/DIAG IV INF INIT: CPT

## 2022-05-22 PROCEDURE — 81001 URINALYSIS AUTO W/SCOPE: CPT | Performed by: PHYSICIAN ASSISTANT

## 2022-05-22 PROCEDURE — 87040 BLOOD CULTURE FOR BACTERIA: CPT | Performed by: EMERGENCY MEDICINE

## 2022-05-22 PROCEDURE — 25010000002 VANCOMYCIN 1 G RECONSTITUTED SOLUTION

## 2022-05-22 PROCEDURE — 80053 COMPREHEN METABOLIC PANEL: CPT | Performed by: PHYSICIAN ASSISTANT

## 2022-05-22 PROCEDURE — 80306 DRUG TEST PRSMV INSTRMNT: CPT | Performed by: PHYSICIAN ASSISTANT

## 2022-05-22 PROCEDURE — 83605 ASSAY OF LACTIC ACID: CPT | Performed by: PHYSICIAN ASSISTANT

## 2022-05-22 PROCEDURE — 87086 URINE CULTURE/COLONY COUNT: CPT | Performed by: PHYSICIAN ASSISTANT

## 2022-05-22 PROCEDURE — 84484 ASSAY OF TROPONIN QUANT: CPT | Performed by: PHYSICIAN ASSISTANT

## 2022-05-22 PROCEDURE — 25010000002 IOPAMIDOL 61 % SOLUTION: Performed by: EMERGENCY MEDICINE

## 2022-05-22 PROCEDURE — 25010000002 PIPERACILLIN SOD-TAZOBACTAM PER 1 G: Performed by: PHYSICIAN ASSISTANT

## 2022-05-22 PROCEDURE — 99283 EMERGENCY DEPT VISIT LOW MDM: CPT

## 2022-05-22 PROCEDURE — 84145 PROCALCITONIN (PCT): CPT | Performed by: PHYSICIAN ASSISTANT

## 2022-05-22 PROCEDURE — 74177 CT ABD & PELVIS W/CONTRAST: CPT

## 2022-05-22 PROCEDURE — 81025 URINE PREGNANCY TEST: CPT | Performed by: PHYSICIAN ASSISTANT

## 2022-05-22 PROCEDURE — 96367 TX/PROPH/DG ADDL SEQ IV INF: CPT

## 2022-05-22 PROCEDURE — 71275 CT ANGIOGRAPHY CHEST: CPT

## 2022-05-22 PROCEDURE — 93005 ELECTROCARDIOGRAM TRACING: CPT | Performed by: PHYSICIAN ASSISTANT

## 2022-05-22 PROCEDURE — 83690 ASSAY OF LIPASE: CPT | Performed by: PHYSICIAN ASSISTANT

## 2022-05-22 PROCEDURE — 83735 ASSAY OF MAGNESIUM: CPT | Performed by: PHYSICIAN ASSISTANT

## 2022-05-22 RX ORDER — SODIUM CHLORIDE 0.9 % (FLUSH) 0.9 %
10 SYRINGE (ML) INJECTION AS NEEDED
Status: DISCONTINUED | OUTPATIENT
Start: 2022-05-22 | End: 2022-05-23 | Stop reason: HOSPADM

## 2022-05-22 RX ADMIN — Medication 1000 MG: at 23:41

## 2022-05-22 RX ADMIN — SODIUM CHLORIDE 1000 ML: 9 INJECTION, SOLUTION INTRAVENOUS at 22:45

## 2022-05-22 RX ADMIN — IOPAMIDOL 100 ML: 612 INJECTION, SOLUTION INTRAVENOUS at 23:57

## 2022-05-22 RX ADMIN — TAZOBACTAM SODIUM AND PIPERACILLIN SODIUM 3.38 G: 375; 3 INJECTION, SOLUTION INTRAVENOUS at 23:12

## 2022-05-23 VITALS
RESPIRATION RATE: 16 BRPM | OXYGEN SATURATION: 100 % | HEART RATE: 98 BPM | HEIGHT: 59 IN | SYSTOLIC BLOOD PRESSURE: 104 MMHG | DIASTOLIC BLOOD PRESSURE: 59 MMHG | BODY MASS INDEX: 23.79 KG/M2 | WEIGHT: 118 LBS | TEMPERATURE: 97.8 F

## 2022-05-23 LAB
BACTERIA SPEC AEROBE CULT: NORMAL
MAGNESIUM SERPL-MCNC: 1.8 MG/DL (ref 1.6–2.6)
TROPONIN T SERPL-MCNC: <0.01 NG/ML (ref 0–0.03)

## 2022-05-23 RX ORDER — POTASSIUM CHLORIDE 750 MG/1
40 CAPSULE, EXTENDED RELEASE ORAL ONCE
Status: DISCONTINUED | OUTPATIENT
Start: 2022-05-23 | End: 2022-05-23 | Stop reason: HOSPADM

## 2022-05-23 NOTE — DISCHARGE INSTRUCTIONS
You likely have bacterial infection in the bloodstream from a urinary tract infection.  It was strongly advised that you stay in the hospital for IV antibiotics.  You need to continue the course of antibiotics that were given to you at your previous visit.  You can follow-up with a primary care provider and urologist Dr. Haddad to ensure you are improving.

## 2022-05-23 NOTE — ED PROVIDER NOTES
"Subjective   Patient is a 36-year-old female with no reported past medical history presenting to the ER for evaluation of abnormal labs.  Reportedly patient started feeling ill around Tuesday 05/17/22.  She states she had diffuse chills, body aches, high fevers, dysuria, low back pain.  She was seen in the ER on 5/20/2022 and had work-up with labs, chest x-ray, respiratory panel, urinalysis.  She was found to have a urinary tract infection and at that time was sent home with an antibiotic.  On over read of the chest x-ray, it was read as a multifocal pneumonia and she also had a blood culture that grew out E. coli.  She was contacted today via phone to inform her of the results.  She states she still felt \"deathly ill\" and she was advised to come back to the ER.  She still continues to have chills, low back pain, subjective fevers, headache.  She adamantly denies any IV drug use, has been positive for methamphetamine on recent UDS.  Denies any cough, chest pain, shortness of breath.          Review of Systems   Constitutional: Positive for chills, fatigue and fever.   HENT: Negative for congestion, ear pain, rhinorrhea and sore throat.    Eyes: Negative.    Respiratory: Negative for cough and shortness of breath.    Cardiovascular: Negative.    Gastrointestinal: Negative.    Genitourinary: Negative.    Musculoskeletal: Positive for back pain.   Skin: Negative.    Allergic/Immunologic: Negative for immunocompromised state.   Neurological: Positive for headaches. Negative for dizziness and syncope.   Psychiatric/Behavioral: Negative.        History reviewed. No pertinent past medical history.    No Known Allergies    History reviewed. No pertinent surgical history.    History reviewed. No pertinent family history.    Social History     Socioeconomic History   • Marital status: Single   Tobacco Use   • Smoking status: Current Every Day Smoker     Packs/day: 0.25     Types: Cigarettes   Substance and Sexual Activity   • " "Alcohol use: Not Currently   • Drug use: Not Currently   • Sexual activity: Defer           Objective   Physical Exam  Vitals and nursing note reviewed.     /59   Pulse 98   Temp 97.8 °F (36.6 °C) (Oral)   Resp 16   Ht 149.9 cm (59\")   Wt 53.5 kg (118 lb)   LMP 04/22/2022 (Approximate)   SpO2 100%   BMI 23.83 kg/m²     GEN: No acute distress, sitting up on the stretcher.  Awake and alert.  Does not appear toxic.  She is answering questions appropriately.  Head: Normocephalic, atraumatic  Eyes: EOM intact  ENT: Mask in place per protocol  Chest: Nontender to palpation  Cardiovascular:  tachycardic, regular rhythm  Lungs: Clear to auscultation bilaterally without adventitious sounds  Abdomen: Soft, nontender, nondistended, no peritoneal signs, no focal guarding  Back: No midline cervical, thoracic and lumbar spine.  She does have some tenderness to the bilateral paraspinal muscles of the lumbar spine.  No obvious lesions or bruising noted.  Extremities: No edema, normal appearance, full range of motion without deficits  Neuro: GCS 15  Psych: Mood and affect are appropriate    Procedures           ED Course  ED Course as of 05/23/22 0115   Sun May 22, 2022   2250 EKG interpreted by me: Sinus tachycardia, no acute ST/T changes, this is an abnormal EKG [MP]   2253 WBC: 6.30 [LA]   2253 RBC: 3.89 [LA]   2253 Platelets: 206 [LA]   2307 RBC, UA: None Seen [LA]   2307 WBC, UA(!): 13-20 [LA]   2307 Bacteria, UA(!): 4+ [LA]   2307 Squamous Epithelial Cells, UA(!): 7-12 [LA]   2307 Hyaline Casts, UA: None Seen [LA]   2307 Methodology:: Manual Light Microscopy [LA]   2307 HCG, Urine QL: Negative [LA]   2307 Color, UA: Yellow [LA]   2307 Appearance, UA(!): Cloudy [LA]   2307 pH, UA: 6.0 [LA]   2307 Specific Gravity, UA: 1.018 [LA]   2307 Glucose: Negative [LA]   2307 Ketones, UA: Negative [LA]   2307 Bilirubin, UA: Negative [LA]   2307 Blood, UA(!): Trace [LA]   2307 Protein, UA(!): 30 mg/dL (1+) [LA]   2307 " "Leukocytes, UA(!): Small (1+) [LA]   2307 Nitrite, UA: Negative [LA]   2307 Urobilinogen, UA: 1.0 E.U./dL [LA]   2307 Methamphetamine, Ur(!): Positive [LA]   2307 Amphetamine, Urine Qual(!): Positive [LA]   2307 Buprenorphine, Screen, Urine(!): Positive [LA]   2307 Procalcitonin: 0.21 [LA]   2322 Lipase: 19 [LA]   2322 Glucose: 94 [LA]   2322 BUN: 7 [LA]   2322 Creatinine: 0.59 [LA]   2322 Sodium: 137 [LA]   2322 Potassium(!): 2.8 [LA]   2322 Chloride: 99 [LA]   2322 CO2: 27.7 [LA]   2322 Calcium: 8.7 [LA]   2322 Total Protein: 7.2 [LA]   2322 Albumin: 3.50 [LA]   2322 ALT (SGPT)(!): 50 [LA]   2322 AST (SGOT)(!): 39 [LA]   2322 Alkaline Phosphatase: 101 [LA]   2322 Total Bilirubin: 0.2 [LA]   2322 Globulin: 3.7 [LA]   2322 A/G Ratio: 0.9 [LA]   2322 BUN/Creatinine Ratio: 11.9 [LA]   2322 Anion Gap: 10.3 [LA]   2322 eGFR: 120.0 [LA]   2322 Lactate: 1.7 [LA]   2322 Nurse informing the patient was questioning while she was receiving antibiotics.  Nurse informing that patient and significant other have been arguing.  I went and discussed findings with the patient.  She seemed very shocked that I thought she would need to stay in the hospital due to her positive blood culture and symptoms.  She states she would really rather just \"play it by ear\" I told her that it is ultimately her decision but she would have to sign out AGAINST MEDICAL ADVICE.  She is willing to obtain CT scans. [LA]   Mon May 23, 2022   0023 Magnesium: 1.8 [LA]   0040 Troponin T: <0.010 [LA]   0101 Discussed the findings with the patient.  Discussed that I believe she needed to be admitted because she is likely bacteremic from pyelonephritis.  She states that she cannot stay in the hospital because her grandmother sick.  I discussed risks of her leaving the hospital including worsening of her condition.  She is on cefuroxime which was susceptible to the E. coli that was grown in her blood culture.  I advised that she continue taking this medication.  " We will also give Dr. Haddad's number for follow-up given the findings of this lobar nephronia on CT scan. [LA]      ED Course User Index  [LA] Mavis Singleton PA-C  [MP] Roland Vasquez MD                                                 MDM  Number of Diagnoses or Management Options  Bacteremia due to Escherichia coli  Hypokalemia  Nephritis, acute  Diagnosis management comments: On arrival, patient is mildly tachycardic but afebrile, normotensive, no acute distress.  Differential could include sepsis, pneumonia, urinary tract infection, septic emboli, electrolyte abnormalities, dehydration, and other concerns.  Will repeat basic labs, troponin, lactic acid, procalcitonin, urinalysis, UPT, blood cultures.  Discussed with Dr. Vasquez, her over read of the x-ray showed a multifocal pneumonia, given history of substance abuse and positive blood cultures, we will go ahead and obtain CT PE as well as CT abdomen pelvis to rule out any kind of septic emboli or other intra-abdominal infection.  Patient was given a liter of IV fluids, given dose of Vancomycin and Zosyn as well.    Patient's blood work here did improve in comparison to previous.  Her leukocytosis had improved.  She had hypokalemia but no other significant electrolyte imbalance.  Her urine still had leukocytes and bacteria but there were some squamous cells in this.  Lactic acid and procalcitonin were within normal limits.  Troponin was not elevated.  Patient was positive for methamphetamine and buprenorphine.  CT PE protocol revealed no pulmonary embolism or other significant abnormality.  CT scan of her abdomen pelvis revealed cholelithiasis and infectious bilateral lobar nephronia, no hydronephrosis.  Given this and history, patient is likely bacteremic from this kidney infection.  She remained stable here in our ER.  I went to discuss admission with the patient but she refused and states that her grandma sick and that she needs to be home.  I  discussed the risks of her leaving AGAINST MEDICAL ADVICE but she still refused admission.  We will have her continue her cefuroxime which was susceptible on the sensitivities of her blood culture results.  We will give Dr. Haddad's number her to follow-up and advise she see her primary care provider as well.       Amount and/or Complexity of Data Reviewed  Clinical lab tests: reviewed and ordered  Tests in the radiology section of CPT®: ordered and reviewed  Discussion of test results with the performing providers: yes  Decide to obtain previous medical records or to obtain history from someone other than the patient: yes  Review and summarize past medical records: yes  Discuss the patient with other providers: yes    Risk of Complications, Morbidity, and/or Mortality  Presenting problems: moderate  Diagnostic procedures: moderate  Management options: moderate    Patient Progress  Patient progress: stable      Final diagnoses:   Bacteremia due to Escherichia coli   Nephritis, acute   Hypokalemia       ED Disposition  ED Disposition     ED Disposition   AMA    Condition   --    Comment   --             Eber Haddad MD  [address] [address] [phone]    Schedule an appointment as soon as possible for a visit            Medication List      No changes were made to your prescriptions during this visit.          Mavis Singleton PA-C  05/23/22 0115

## 2022-05-23 NOTE — ED NOTES
Pt requesting to leave AMA, pt refusing further work up and treatment at this time. Provider aware and gives consent to sign patient out AMA. AMA form reviewed and signed per pt. See paper chart.  Pt is alert and oriented x4, pwd, reu at time of d/c. Pt ambulates out of ED with strong and steady gait noted.

## 2022-05-25 LAB — BACTERIA SPEC AEROBE CULT: NORMAL

## 2022-05-27 LAB — BACTERIA SPEC AEROBE CULT: NORMAL

## 2022-05-28 LAB — BACTERIA SPEC AEROBE CULT: NORMAL

## 2022-06-26 ENCOUNTER — HOSPITAL ENCOUNTER (EMERGENCY)
Facility: HOSPITAL | Age: 37
Discharge: HOME | End: 2022-06-26
Attending: EMERGENCY MEDICINE | Admitting: EMERGENCY MEDICINE

## 2022-06-26 VITALS
OXYGEN SATURATION: 94 % | RESPIRATION RATE: 16 BRPM | TEMPERATURE: 98.4 F | HEART RATE: 114 BPM | WEIGHT: 114 LBS | BODY MASS INDEX: 22.98 KG/M2 | DIASTOLIC BLOOD PRESSURE: 95 MMHG | SYSTOLIC BLOOD PRESSURE: 126 MMHG | HEIGHT: 59 IN

## 2022-06-26 DIAGNOSIS — J06.9 VIRAL UPPER RESPIRATORY INFECTION: Primary | ICD-10-CM

## 2022-06-26 DIAGNOSIS — N39.0 URINARY TRACT INFECTION WITHOUT HEMATURIA, SITE UNSPECIFIED: ICD-10-CM

## 2022-06-26 LAB
B PARAPERT DNA SPEC QL NAA+PROBE: NOT DETECTED
B PERT DNA SPEC QL NAA+PROBE: NOT DETECTED
BACTERIA UR QL AUTO: ABNORMAL /HPF
BILIRUB UR QL STRIP: NEGATIVE
C PNEUM DNA NPH QL NAA+NON-PROBE: NOT DETECTED
CLARITY UR: ABNORMAL
COLOR UR: YELLOW
FLUAV SUBTYP SPEC NAA+PROBE: NOT DETECTED
FLUBV RNA ISLT QL NAA+PROBE: NOT DETECTED
GLUCOSE UR STRIP-MCNC: NEGATIVE MG/DL
HADV DNA SPEC NAA+PROBE: DETECTED
HCOV 229E RNA SPEC QL NAA+PROBE: NOT DETECTED
HCOV HKU1 RNA SPEC QL NAA+PROBE: NOT DETECTED
HCOV NL63 RNA SPEC QL NAA+PROBE: NOT DETECTED
HCOV OC43 RNA SPEC QL NAA+PROBE: NOT DETECTED
HGB UR QL STRIP.AUTO: ABNORMAL
HMPV RNA NPH QL NAA+NON-PROBE: DETECTED
HPIV1 RNA ISLT QL NAA+PROBE: NOT DETECTED
HPIV2 RNA SPEC QL NAA+PROBE: NOT DETECTED
HPIV3 RNA NPH QL NAA+PROBE: NOT DETECTED
HPIV4 P GENE NPH QL NAA+PROBE: NOT DETECTED
HYALINE CASTS UR QL AUTO: ABNORMAL /LPF
KETONES UR QL STRIP: NEGATIVE
LEUKOCYTE ESTERASE UR QL STRIP.AUTO: ABNORMAL
M PNEUMO IGG SER IA-ACNC: NOT DETECTED
NITRITE UR QL STRIP: NEGATIVE
PH UR STRIP.AUTO: 6.5 [PH] (ref 5–8)
PROT UR QL STRIP: NEGATIVE
RBC # UR STRIP: ABNORMAL /HPF
REF LAB TEST METHOD: ABNORMAL
RHINOVIRUS RNA SPEC NAA+PROBE: NOT DETECTED
RSV RNA NPH QL NAA+NON-PROBE: NOT DETECTED
S PYO AG THROAT QL: NEGATIVE
SARS-COV-2 RNA NPH QL NAA+NON-PROBE: NOT DETECTED
SP GR UR STRIP: 1.02 (ref 1–1.03)
SQUAMOUS #/AREA URNS HPF: ABNORMAL /HPF
UROBILINOGEN UR QL STRIP: ABNORMAL
WBC # UR STRIP: ABNORMAL /HPF

## 2022-06-26 PROCEDURE — 81001 URINALYSIS AUTO W/SCOPE: CPT

## 2022-06-26 PROCEDURE — 87081 CULTURE SCREEN ONLY: CPT | Performed by: EMERGENCY MEDICINE

## 2022-06-26 PROCEDURE — 87086 URINE CULTURE/COLONY COUNT: CPT

## 2022-06-26 PROCEDURE — 99283 EMERGENCY DEPT VISIT LOW MDM: CPT

## 2022-06-26 PROCEDURE — 0202U NFCT DS 22 TRGT SARS-COV-2: CPT | Performed by: EMERGENCY MEDICINE

## 2022-06-26 PROCEDURE — 96365 THER/PROPH/DIAG IV INF INIT: CPT

## 2022-06-26 PROCEDURE — 87186 SC STD MICRODIL/AGAR DIL: CPT

## 2022-06-26 PROCEDURE — 25010000002 CEFTRIAXONE SODIUM-DEXTROSE 1-3.74 GM-%(50ML) RECONSTITUTED SOLUTION: Performed by: EMERGENCY MEDICINE

## 2022-06-26 PROCEDURE — 87880 STREP A ASSAY W/OPTIC: CPT | Performed by: EMERGENCY MEDICINE

## 2022-06-26 RX ORDER — CEFTRIAXONE 1 G/50ML
1 INJECTION, SOLUTION INTRAVENOUS ONCE
Status: COMPLETED | OUTPATIENT
Start: 2022-06-26 | End: 2022-06-26

## 2022-06-26 RX ORDER — CEFDINIR 300 MG/1
300 CAPSULE ORAL 2 TIMES DAILY
Qty: 14 CAPSULE | Refills: 0 | Status: SHIPPED | OUTPATIENT
Start: 2022-06-26 | End: 2022-07-03

## 2022-06-26 RX ORDER — BENZONATATE 100 MG/1
100 CAPSULE ORAL ONCE
Status: COMPLETED | OUTPATIENT
Start: 2022-06-26 | End: 2022-06-26

## 2022-06-26 RX ADMIN — BENZONATATE 100 MG: 100 CAPSULE ORAL at 22:28

## 2022-06-26 RX ADMIN — SODIUM CHLORIDE 1000 ML: 9 INJECTION, SOLUTION INTRAVENOUS at 22:27

## 2022-06-26 RX ADMIN — CEFTRIAXONE 1 G: 1 INJECTION, SOLUTION INTRAVENOUS at 22:27

## 2022-06-28 LAB — BACTERIA SPEC AEROBE CULT: NORMAL

## 2022-06-29 LAB — BACTERIA SPEC AEROBE CULT: ABNORMAL

## 2023-02-05 ENCOUNTER — HOSPITAL ENCOUNTER (EMERGENCY)
Facility: HOSPITAL | Age: 38
Discharge: LEFT BEFORE DISCHARGE | End: 2023-02-05
Attending: STUDENT IN AN ORGANIZED HEALTH CARE EDUCATION/TRAINING PROGRAM
Payer: COMMERCIAL

## 2023-02-05 VITALS
WEIGHT: 117 LBS | TEMPERATURE: 98.2 F | OXYGEN SATURATION: 99 % | SYSTOLIC BLOOD PRESSURE: 136 MMHG | HEART RATE: 115 BPM | BODY MASS INDEX: 23.59 KG/M2 | RESPIRATION RATE: 18 BRPM | HEIGHT: 59 IN | DIASTOLIC BLOOD PRESSURE: 95 MMHG

## 2023-02-05 PROCEDURE — 99211 OFF/OP EST MAY X REQ PHY/QHP: CPT | Performed by: STUDENT IN AN ORGANIZED HEALTH CARE EDUCATION/TRAINING PROGRAM

## 2023-11-22 ENCOUNTER — HOSPITAL ENCOUNTER (EMERGENCY)
Facility: HOSPITAL | Age: 38
Discharge: LEFT BEFORE DISCHARGE | End: 2023-11-22
Attending: EMERGENCY MEDICINE
Payer: COMMERCIAL

## 2023-11-22 VITALS
WEIGHT: 112.5 LBS | SYSTOLIC BLOOD PRESSURE: 133 MMHG | TEMPERATURE: 97.9 F | BODY MASS INDEX: 26.04 KG/M2 | DIASTOLIC BLOOD PRESSURE: 88 MMHG | OXYGEN SATURATION: 100 % | HEIGHT: 55 IN | HEART RATE: 121 BPM | RESPIRATION RATE: 18 BRPM

## 2023-11-22 PROCEDURE — 99211 OFF/OP EST MAY X REQ PHY/QHP: CPT | Performed by: EMERGENCY MEDICINE

## 2023-11-22 RX ORDER — BUPRENORPHINE HYDROCHLORIDE AND NALOXONE HYDROCHLORIDE DIHYDRATE 8; 2 MG/1; MG/1
1 TABLET SUBLINGUAL DAILY
COMMUNITY

## 2023-11-22 NOTE — ED NOTES
"No rash noted on skin or upper lip, pt does report burning to R index finger. States a \"brown film was all over car, and was afraid she had come in contact with a chemical\"  "

## 2024-11-01 ENCOUNTER — OFFICE VISIT (OUTPATIENT)
Dept: NEUROLOGY | Facility: CLINIC | Age: 39
End: 2024-11-01
Payer: COMMERCIAL

## 2024-11-01 ENCOUNTER — LAB (OUTPATIENT)
Dept: LAB | Facility: HOSPITAL | Age: 39
End: 2024-11-01
Payer: COMMERCIAL

## 2024-11-01 VITALS
BODY MASS INDEX: 28.56 KG/M2 | HEIGHT: 55 IN | OXYGEN SATURATION: 99 % | DIASTOLIC BLOOD PRESSURE: 78 MMHG | HEART RATE: 74 BPM | TEMPERATURE: 98.2 F | SYSTOLIC BLOOD PRESSURE: 126 MMHG | WEIGHT: 123.4 LBS | RESPIRATION RATE: 14 BRPM

## 2024-11-01 DIAGNOSIS — M79.602 PARESTHESIA AND PAIN OF BOTH UPPER EXTREMITIES: Primary | ICD-10-CM

## 2024-11-01 DIAGNOSIS — R20.2 PARESTHESIA AND PAIN OF BOTH UPPER EXTREMITIES: ICD-10-CM

## 2024-11-01 DIAGNOSIS — M79.602 PARESTHESIA AND PAIN OF BOTH UPPER EXTREMITIES: ICD-10-CM

## 2024-11-01 DIAGNOSIS — M79.601 PARESTHESIA AND PAIN OF BOTH UPPER EXTREMITIES: Primary | ICD-10-CM

## 2024-11-01 DIAGNOSIS — R20.2 PARESTHESIA AND PAIN OF BOTH UPPER EXTREMITIES: Primary | ICD-10-CM

## 2024-11-01 DIAGNOSIS — M79.601 PARESTHESIA AND PAIN OF BOTH UPPER EXTREMITIES: ICD-10-CM

## 2024-11-01 LAB
25(OH)D3 SERPL-MCNC: 23 NG/ML (ref 30–100)
AMPHET+METHAMPHET UR QL: NEGATIVE
AMPHETAMINES UR QL: NEGATIVE
BARBITURATES UR QL SCN: NEGATIVE
BENZODIAZ UR QL SCN: NEGATIVE
BUPRENORPHINE SERPL-MCNC: POSITIVE NG/ML
CANNABINOIDS SERPL QL: NEGATIVE
COCAINE UR QL: NEGATIVE
FENTANYL UR-MCNC: NEGATIVE NG/ML
FOLATE SERPL-MCNC: 8.36 NG/ML (ref 4.78–24.2)
METHADONE UR QL SCN: NEGATIVE
OPIATES UR QL: NEGATIVE
OXYCODONE UR QL SCN: NEGATIVE
PCP UR QL SCN: NEGATIVE
TRICYCLICS UR QL SCN: NEGATIVE
TSH SERPL DL<=0.05 MIU/L-ACNC: 0.96 UIU/ML (ref 0.27–4.2)
VIT B12 BLD-MCNC: 533 PG/ML (ref 211–946)

## 2024-11-01 PROCEDURE — 82607 VITAMIN B-12: CPT

## 2024-11-01 PROCEDURE — 82306 VITAMIN D 25 HYDROXY: CPT

## 2024-11-01 PROCEDURE — 36415 COLL VENOUS BLD VENIPUNCTURE: CPT

## 2024-11-01 PROCEDURE — 83921 ORGANIC ACID SINGLE QUANT: CPT

## 2024-11-01 PROCEDURE — 84443 ASSAY THYROID STIM HORMONE: CPT

## 2024-11-01 PROCEDURE — 82746 ASSAY OF FOLIC ACID SERUM: CPT

## 2024-11-01 PROCEDURE — 83036 HEMOGLOBIN GLYCOSYLATED A1C: CPT

## 2024-11-01 PROCEDURE — 80307 DRUG TEST PRSMV CHEM ANLYZR: CPT

## 2024-11-01 PROCEDURE — 86038 ANTINUCLEAR ANTIBODIES: CPT

## 2024-11-02 LAB — HBA1C MFR BLD: 4.8 % (ref 4.8–5.6)

## 2024-11-04 LAB — ANA SER QL: NEGATIVE

## 2024-11-05 ENCOUNTER — PATIENT ROUNDING (BHMG ONLY) (OUTPATIENT)
Dept: NEUROLOGY | Facility: CLINIC | Age: 39
End: 2024-11-05
Payer: COMMERCIAL

## 2024-11-11 LAB — METHYLMALONATE SERPL-SCNC: 172 NMOL/L (ref 0–378)

## 2024-12-01 ENCOUNTER — HOSPITAL ENCOUNTER (EMERGENCY)
Facility: HOSPITAL | Age: 39
Discharge: HOME | End: 2024-12-01
Attending: EMERGENCY MEDICINE | Admitting: EMERGENCY MEDICINE
Payer: COMMERCIAL

## 2024-12-01 VITALS
TEMPERATURE: 97.8 F | SYSTOLIC BLOOD PRESSURE: 97 MMHG | WEIGHT: 121 LBS | DIASTOLIC BLOOD PRESSURE: 55 MMHG | HEIGHT: 60 IN | HEART RATE: 70 BPM | RESPIRATION RATE: 18 BRPM | OXYGEN SATURATION: 100 % | BODY MASS INDEX: 23.75 KG/M2

## 2024-12-01 DIAGNOSIS — J02.9 PHARYNGITIS, UNSPECIFIED ETIOLOGY: Primary | ICD-10-CM

## 2024-12-01 LAB — S PYO AG THROAT QL: NEGATIVE

## 2024-12-01 PROCEDURE — 99283 EMERGENCY DEPT VISIT LOW MDM: CPT | Performed by: EMERGENCY MEDICINE

## 2024-12-01 PROCEDURE — 87880 STREP A ASSAY W/OPTIC: CPT

## 2024-12-01 PROCEDURE — 87081 CULTURE SCREEN ONLY: CPT | Performed by: EMERGENCY MEDICINE

## 2024-12-01 RX ORDER — IBUPROFEN 800 MG/1
800 TABLET, FILM COATED ORAL ONCE
Status: DISCONTINUED | OUTPATIENT
Start: 2024-12-01 | End: 2024-12-01 | Stop reason: HOSPADM

## 2024-12-01 RX ORDER — IBUPROFEN 800 MG/1
800 TABLET, FILM COATED ORAL EVERY 12 HOURS PRN
Qty: 14 TABLET | Refills: 0 | Status: SHIPPED | OUTPATIENT
Start: 2024-12-01 | End: 2024-12-08

## 2024-12-01 RX ORDER — AZITHROMYCIN 250 MG/1
TABLET, FILM COATED ORAL
Qty: 6 TABLET | Refills: 0 | Status: SHIPPED | OUTPATIENT
Start: 2024-12-01

## 2024-12-01 RX ORDER — PREDNISONE 5 MG/1
1 TABLET ORAL DAILY
Qty: 48 TABLET | Refills: 0 | Status: SHIPPED | OUTPATIENT
Start: 2024-12-01

## 2024-12-01 NOTE — ED PROVIDER NOTES
"Subjective:  History of Present Illness:    Patient is a 39-year-old female without contributing health history.  Presents to the ER today for sore throat x 3 days.  Patient also reports white exudate to bilateral tonsil.  Denies any other associated symptom.  Denies chest pain shortness of breath.  Denies abdominal pain.  Denies change in bowel or bladder habit.  Denies OTC medication or home remedy.  Denies alleviating or exacerbating factors.    Nurses Notes reviewed and agree, including vitals, allergies, social history and prior medical history.     REVIEW OF SYSTEMS: All systems reviewed and not pertinent unless noted.  Review of Systems   HENT:  Positive for sore throat.    All other systems reviewed and are negative.      Past Medical History:   Diagnosis Date    Hepatitis C        Allergies:    Patient has no known allergies.      No past surgical history on file.      Social History     Socioeconomic History    Marital status: Single   Tobacco Use    Smoking status: Former     Current packs/day: 0.00     Types: Cigarettes     Quit date: 2024     Years since quittin.7   Substance and Sexual Activity    Alcohol use: Not Currently    Drug use: Not Currently     Comment: narcotics- sober for 10 months    Sexual activity: Defer         Family History   Problem Relation Age of Onset    Seizures Mother     Dementia Maternal Grandmother        Objective  Physical Exam:  BP 97/55 (BP Location: Left arm, Patient Position: Sitting)   Pulse 70   Temp 97.8 °F (36.6 °C)   Resp 18   Ht 152.4 cm (60\")   Wt 54.9 kg (121 lb)   SpO2 100%   BMI 23.63 kg/m²      Physical Exam  Vitals and nursing note reviewed.   Constitutional:       Appearance: She is well-developed and normal weight.   HENT:      Head: Normocephalic and atraumatic.      Right Ear: Tympanic membrane and ear canal normal.      Left Ear: Tympanic membrane and ear canal normal.      Mouth/Throat:      Mouth: Mucous membranes are moist.   Eyes:      " Conjunctiva/sclera: Conjunctivae normal.   Cardiovascular:      Rate and Rhythm: Normal rate and regular rhythm.      Heart sounds: Normal heart sounds.   Pulmonary:      Effort: Pulmonary effort is normal.      Breath sounds: Normal breath sounds.   Abdominal:      General: Bowel sounds are normal.      Palpations: Abdomen is soft.   Musculoskeletal:      Cervical back: Normal range of motion.   Skin:     General: Skin is warm and dry.      Capillary Refill: Capillary refill takes less than 2 seconds.   Neurological:      General: No focal deficit present.      Mental Status: She is alert and oriented to person, place, and time.   Psychiatric:         Mood and Affect: Mood normal.         Behavior: Behavior normal.         Procedures    ED Course:         Lab Results (last 24 hours)       Procedure Component Value Units Date/Time    Rapid Strep A Screen - Swab, Throat [[id number]]  (Normal) Collected: 12/01/24 1136    Specimen: Swab from Throat Updated: 12/01/24 1146     Strep A Ag Negative    Beta Strep Culture, Throat - Swab, Throat [[id number]] Collected: 12/01/24 1136    Specimen: Swab from Throat Updated: 12/01/24 1146             No radiology results from the last 24 hrs       MDM      Initial impression of presenting illness: Patient is a 39-year-old female without contributing health history.  Presents to the ER today for sore throat x 3 days.  Patient also reports white exudate to bilateral tonsil.  Denies any other associated symptom.  Denies chest pain shortness of breath.  Denies abdominal pain.  Denies change in bowel or bladder habit.  Denies OTC medication or home remedy.  Denies alleviating or exacerbating factors.    DDX: includes but is not limited to: COVID, flu, strep, or other    Patient arrives stable with vitals interpreted by myself.     Pertinent features from physical exam: Lung sounds are clear bilaterally throughout.  Abdo soft nontender.  Bowel sounds normal.  Heart sounds are normal.   Bilateral TM is without erythema or effusion.  Bilateral external canals without erythema or drainage..    Initial diagnostic plan: Strep    Results from initial plan were reviewed and interpreted by me revealing strep test was negative    Diagnostic information from other sources: Chart review    Interventions / Re-evaluation: Vital signs stable throughout counter    Results/clinical rationale were discussed with patient    Consultations/Discussion of results with other physicians: N/A    Disposition plan: Patient is hemodynamically stable nontoxic-appearing appropriate discharge.  Have encouraged patient to start steroid Dosepak.  Have provided with antibiotic only if symptoms or not improving after 7 days.  Follow-up PCP in 1 week.  Follow-up ER for new or symptoms.  -----        Final diagnoses:   Pharyngitis, unspecified etiology          Vijay Chaves, APRN  12/01/24 [id number]

## 2024-12-03 LAB — BACTERIA SPEC AEROBE CULT: NORMAL

## 2024-12-06 DIAGNOSIS — M79.601 PARESTHESIA AND PAIN OF BOTH UPPER EXTREMITIES: ICD-10-CM

## 2024-12-06 DIAGNOSIS — R20.2 PARESTHESIA AND PAIN OF BOTH UPPER EXTREMITIES: ICD-10-CM

## 2024-12-06 DIAGNOSIS — M79.602 PARESTHESIA AND PAIN OF BOTH UPPER EXTREMITIES: ICD-10-CM

## 2024-12-06 RX ORDER — GABAPENTIN 300 MG/1
300 CAPSULE ORAL NIGHTLY
Qty: 30 CAPSULE | Refills: 2 | OUTPATIENT
Start: 2024-12-06

## 2025-01-24 ENCOUNTER — SPECIALTY PHARMACY (OUTPATIENT)
Dept: PHARMACY | Facility: HOSPITAL | Age: 40
End: 2025-01-24
Payer: COMMERCIAL

## 2025-01-24 ENCOUNTER — LAB (OUTPATIENT)
Dept: LAB | Facility: HOSPITAL | Age: 40
End: 2025-01-24
Payer: COMMERCIAL

## 2025-01-24 VITALS
OXYGEN SATURATION: 97 % | WEIGHT: 102.4 LBS | DIASTOLIC BLOOD PRESSURE: 63 MMHG | SYSTOLIC BLOOD PRESSURE: 99 MMHG | HEART RATE: 65 BPM | BODY MASS INDEX: 20 KG/M2

## 2025-01-24 DIAGNOSIS — B18.2 CHRONIC HEPATITIS C WITHOUT HEPATIC COMA: Primary | ICD-10-CM

## 2025-01-24 DIAGNOSIS — F19.91 HISTORY OF ILLICIT DRUG USE: ICD-10-CM

## 2025-01-24 DIAGNOSIS — K74.00 LIVER FIBROSIS: ICD-10-CM

## 2025-01-24 DIAGNOSIS — B18.2 CHRONIC HEPATITIS C WITHOUT HEPATIC COMA: ICD-10-CM

## 2025-01-24 LAB
ALBUMIN SERPL-MCNC: 4.5 G/DL (ref 3.5–5.2)
ALBUMIN/GLOB SERPL: 1.2 G/DL
ALP SERPL-CCNC: 70 U/L (ref 39–117)
ALT SERPL W P-5'-P-CCNC: 10 U/L (ref 1–33)
ANION GAP SERPL CALCULATED.3IONS-SCNC: 11.4 MMOL/L (ref 5–15)
AST SERPL-CCNC: 22 U/L (ref 1–32)
BILIRUB SERPL-MCNC: 0.3 MG/DL (ref 0–1.2)
BUN SERPL-MCNC: 10 MG/DL (ref 6–20)
BUN/CREAT SERPL: 13.3 (ref 7–25)
CALCIUM SPEC-SCNC: 9.5 MG/DL (ref 8.6–10.5)
CHLORIDE SERPL-SCNC: 105 MMOL/L (ref 98–107)
CO2 SERPL-SCNC: 23.6 MMOL/L (ref 22–29)
CREAT SERPL-MCNC: 0.75 MG/DL (ref 0.57–1)
DEPRECATED RDW RBC AUTO: 38.9 FL (ref 37–54)
EGFRCR SERPLBLD CKD-EPI 2021: 104 ML/MIN/1.73
ERYTHROCYTE [DISTWIDTH] IN BLOOD BY AUTOMATED COUNT: 12.3 % (ref 12.3–15.4)
GLOBULIN UR ELPH-MCNC: 3.9 GM/DL
GLUCOSE SERPL-MCNC: 74 MG/DL (ref 65–99)
HBV SURFACE AB SER RIA-ACNC: REACTIVE
HBV SURFACE AG SERPL QL IA: NORMAL
HCG SERPL QL: NEGATIVE
HCT VFR BLD AUTO: 37.4 % (ref 34–46.6)
HGB BLD-MCNC: 12.6 G/DL (ref 12–15.9)
HIV 1+2 AB+HIV1 P24 AG SERPL QL IA: NORMAL
INR PPP: 1.06 (ref 0.9–1.1)
MCH RBC QN AUTO: 29.6 PG (ref 26.6–33)
MCHC RBC AUTO-ENTMCNC: 33.7 G/DL (ref 31.5–35.7)
MCV RBC AUTO: 87.8 FL (ref 79–97)
PLATELET # BLD AUTO: 187 10*3/MM3 (ref 140–450)
PMV BLD AUTO: 11.3 FL (ref 6–12)
POTASSIUM SERPL-SCNC: 4 MMOL/L (ref 3.5–5.2)
PROT SERPL-MCNC: 8.4 G/DL (ref 6–8.5)
PROTHROMBIN TIME: 14.4 SECONDS (ref 12.3–15.1)
RBC # BLD AUTO: 4.26 10*6/MM3 (ref 3.77–5.28)
SODIUM SERPL-SCNC: 140 MMOL/L (ref 136–145)
WBC NRBC COR # BLD AUTO: 2.7 10*3/MM3 (ref 3.4–10.8)

## 2025-01-24 PROCEDURE — 86708 HEPATITIS A ANTIBODY: CPT

## 2025-01-24 PROCEDURE — 87340 HEPATITIS B SURFACE AG IA: CPT

## 2025-01-24 PROCEDURE — 85610 PROTHROMBIN TIME: CPT

## 2025-01-24 PROCEDURE — 80053 COMPREHEN METABOLIC PANEL: CPT

## 2025-01-24 PROCEDURE — 84703 CHORIONIC GONADOTROPIN ASSAY: CPT

## 2025-01-24 PROCEDURE — 85027 COMPLETE CBC AUTOMATED: CPT

## 2025-01-24 PROCEDURE — 36415 COLL VENOUS BLD VENIPUNCTURE: CPT

## 2025-01-24 PROCEDURE — 86706 HEP B SURFACE ANTIBODY: CPT

## 2025-01-24 PROCEDURE — 86704 HEP B CORE ANTIBODY TOTAL: CPT

## 2025-01-24 PROCEDURE — 87522 HEPATITIS C REVRS TRNSCRPJ: CPT

## 2025-01-24 PROCEDURE — G0463 HOSPITAL OUTPT CLINIC VISIT: HCPCS

## 2025-01-24 PROCEDURE — G0432 EIA HIV-1/HIV-2 SCREEN: HCPCS

## 2025-01-24 RX ORDER — BUPRENORPHINE AND NALOXONE 8; 2 MG/1; MG/1
2 FILM, SOLUBLE BUCCAL; SUBLINGUAL DAILY
COMMUNITY
Start: 2025-01-15

## 2025-01-24 NOTE — PROGRESS NOTES
"     New Patient Consult      Date: 2025   Patient Name: Mavis Byrd  MRN: [id number]  : 1985     Primary Care Provider: Vanessa Galeana APRN  Referring Provider: Kervin    Chief Complaint   Patient presents with    Hepatitis C     Pt has been treated for Hep C through [facility]. She began tx in 2022. She presents today with concerns about a smell coming from her skin, is concerned it is due to liver damage.     History of Present Illness: Mavis Byrd is a 39 y.o. female who is here today as a consultation with Gastroenterology for evaluation of Hepatitis C.    She found out about having Hepatitis C infection approx 5 years ago. She has had prior treatment for hepatitis with Epclusa 1 tab PO once daily x 12 weeks, prescribed by [facility] in approx 2023.  She admits that at that time, she only took 2 out of 3 months of the medication and never had follow-up labs with that clinic.  She states that her PCP checked her labs later and told her hep C was cured.  Reports no known personal history of liver disease including other viral hepatitis. There is no known family history of liver disease or cirrhosis. She admits to previous intranasal drug use. Has been clean now for almost 1 year. Takes Suboxone as prescribed. She does have nonprofessional tattoo, 1 done in [facility]. Denies having previous alcoholism. She does not currently drink alcohol. She denies  current illicit drug use including marijuana. No recent liver imaging. She has not had recent labs. She has not had previous vaccinations for Hepatitis A and B. She is currently in school, not working.    She reports an intermittent foul smell which she thinks is coming from her skin for the past several months.  She is not sure if it is related to her liver.  This smell is described as like something is \"burnt.\"    No current GI complaints.    Subjective      Past Medical History:   Diagnosis Date    Hepatitis C      History " reviewed. No pertinent surgical history.    Family History   Problem Relation Age of Onset    Seizures Mother     Dementia Maternal Grandmother      Social History     Socioeconomic History    Marital status: Single   Tobacco Use    Smoking status: Former     Current packs/day: 0.00     Types: Cigarettes     Quit date: 2024     Years since quittin.9   Vaping Use    Vaping status: Every Day    Substances: Nicotine    Devices: Disposable   Substance and Sexual Activity    Alcohol use: Not Currently    Drug use: Not Currently     Comment: narcotics- sober almost a year    Sexual activity: Defer     Current Outpatient Medications:     buprenorphine-naloxone (SUBOXONE) 8-2 MG film film, Place 2 films under the tongue Daily., Disp: , Rfl:     No Known Allergies    The following portions of the patient's history were reviewed and updated as appropriate: allergies, current medications, past family history, past medical history, past social history, past surgical history and problem list.    Objective     Physical Exam  Constitutional:       General: She is not in acute distress.     Appearance: Normal appearance. She is well-developed. She is not diaphoretic.   HENT:      Head: Normocephalic and atraumatic.      Right Ear: External ear normal.      Left Ear: External ear normal.      Nose: Nose normal.   Eyes:      General: No scleral icterus.        Right eye: No discharge.         Left eye: No discharge.      Conjunctiva/sclera: Conjunctivae normal.   Neck:      Trachea: No tracheal deviation.   Pulmonary:      Effort: Pulmonary effort is normal. No respiratory distress.   Musculoskeletal:         General: Normal range of motion.      Cervical back: Normal range of motion.   Skin:     Coloration: Skin is not pale.      Findings: No erythema or rash.      Comments: Tattoo on the back of her neck   Neurological:      Mental Status: She is alert and oriented to person, place, and time.      Coordination: Coordination  normal.   Psychiatric:         Behavior: Behavior normal.         Thought Content: Thought content normal.         Judgment: Judgment normal.      Comments: Anxious affect         Vitals:    01/24/25 1128   BP: 99/63   Pulse: 65   SpO2: 97%   Weight: 46.4 kg (102 lb 6.4 oz)     Results Review:   I have reviewed the patient's new clinical and imaging results.     Vitamin B-12 11/01/2024 533  211 - 946 pg/mL Final    Folate 11/01/2024 8.36  4.78 - 24.20 ng/mL Final    Methylmalonic Acid 11/01/2024 172  0 - 378 nmol/L Final    MARTIN Direct 11/01/2024 Negative  Negative Final    TSH 11/01/2024 0.959  0.270 - 4.200 uIU/mL Final    Hemoglobin A1C 11/01/2024 4.80  4.80 - 5.60 % Final    25 Hydroxy, Vitamin D 11/01/2024 23.0 (L)  30.0 - 100.0 ng/ml Final    Fentanyl, Urine 11/01/2024 Negative  Negative Final      Labs 9-2023 hepatitis C genotype 1, HCV ,899, AST 44, ALT 51, alkaline phosphatase 68, total bilirubin 0.3.    Labs 10/2/2024 hepatitis B surface antigen negative, hepatitis B surface antibody qualitative reactive, hepatitis B core antibody total positive, hepatitis C antibody positive, hepatitis A total antibody positive, hepatitis B core total antibody positive.    CTAP 5-2022  Findings:  The lung bases are clear.  The liver enhances homogeneously. Calcified stones in the gallbladder.  There is no biliary dilatation. The portal and splenic veins appear patent.  The spleen, pancreas, and adrenal glands are unremarkable.  Areas of wedge-shaped hypoattenuation extending to the cortex in both kidneys.  No hydronephrosis. Small hypodense bilateral renal cysts.  No bowel obstruction, pneumoperitoneum, or pneumatosis. The appendix is normal.  The abdominal aorta is not aneurysmal. There are no enlarged abdominal or pelvic lymph nodes.  The urinary bladder and uterus are unremarkable.  Probable tampon in the vaginal cavity.  Mild pelvic free fluid may be physiologic.  There are no aggressive osseous  lesions.  IMPRESSION:  1. Infectious bilateral lobar nephronia. No hydronephrosis.  2. Cholelithiasis.    Assessment / Plan      1. Chronic hepatitis C without hepatic coma  2. Liver fibrosis  3. History of illicit drug use  She has history of chronic Hepatitis C infection, treatment experienced (8/12 weeks of Epclusa in 2023 prescribed by ), without known cirrhosis. No recent HCV RNA lab on file to review. FibroScan completed at  9-2023 showed , E 9.5.  Most consistent with F3 advanced fibrosis.  No recent abdominal imaging.  Liver normal on CTAP in 2022.  Labs  showed hepatitis C antibody positive, hepatitis B surface antigen negative.  Based on prior history and prior hepatitis C treatment, will need to evaluate hepatitis C further to see if she responded to prior treatment.  If she does have advanced fibrosis, will need long-term management.    Labs today  May need further liver imaging after review of lab results  Continue to abstain from illicit drug use and alcohol use  If she does have advanced fibrosis, will need long-term monitoring in the GI clinic  She is immune to both hepatitis A&B, no vaccinations needed    - CBC (No Diff); Future  - Comprehensive Metabolic Panel; Future  - hCG, Serum, Qualitative; Future  - HCV FibroSURE; Future  - HCV RNA By PCR, Qn Rfx Yolande; Future  - Hepatitis A Antibody, Total; Future  - Hepatitis B Core Antibody, Total; Future  - Hepatitis B Surface Antibody; Future  - Hepatitis B Surface Antigen; Future  - HIV-1 / O / 2 Ag / Antibody; Future  - Protime-INR; Future    Follow Up:   She will be called with interpretation of results to determine if any follow-up is needed.    Betina Garrido PA-C  [facility]  1/24/2025  13:51 EST    Dictated Utilizing Dragon Dictation: Part of this note may be an electronic transcription/translation of spoken language to printed text using the Dragon Dictation System.

## 2025-01-24 NOTE — LETTER
2025     EDUARDO Song  [address] [place] [address]    Patient: Mavis Byrd   YOB: 1985   Date of Visit: 2025       Dear EDUARDO Song,    Thank you for referring Mavis Byrd to me for evaluation. Below is a copy of my consult note.    If you have questions, please do not hesitate to call me. I look forward to following Mavis along with you.         Sincerely,        [facility]        CC: No Recipients         New Patient Consult      Date: 2025   Patient Name: Mavis Byrd  MRN: [id number]  : 1985     Primary Care Provider: Vanessa Galeana APRN  Referring Provider: Kervin    Chief Complaint   Patient presents with   • Hepatitis C     Pt has been treated for Hep C through [facility]. She began tx in 2022. She presents today with concerns about a smell coming from her skin, is concerned it is due to liver damage.     History of Present Illness: Mavis Byrd is a 39 y.o. female who is here today as a consultation with Gastroenterology for evaluation of Hepatitis C.    She found out about having Hepatitis C infection approx 5 years ago. She has had prior treatment for hepatitis with Epclusa 1 tab PO once daily x 12 weeks, prescribed by [facility] in approx 2023.  She admits that at that time, she only took 2 out of 3 months of the medication and never had follow-up labs with that clinic.  She states that her PCP checked her labs later and told her hep C was cured.  Reports no known personal history of liver disease including other viral hepatitis. There is no known family history of liver disease or cirrhosis. She admits to previous intranasal drug use. Has been clean now for almost 1 year. Takes Suboxone as prescribed. She does have nonprofessional tattoo, 1 done [facility] [place]. Denies having previous alcoholism. She does not currently drink alcohol. She denies  current illicit drug use including marijuana. No recent  "liver imaging. She has not had recent labs. She has not had previous vaccinations for Hepatitis A and B. She is currently in school, not working.    She reports an intermittent foul smell which she thinks is coming from her skin for the past several months.  She is not sure if it is related to her liver.  This smell is described as like something is \"burnt.\"    No current GI complaints.    Subjective      Past Medical History:   Diagnosis Date   • Hepatitis C      History reviewed. No pertinent surgical history.    Family History   Problem Relation Age of Onset   • Seizures Mother    • Dementia Maternal Grandmother      Social History     Socioeconomic History   • Marital status: Single   Tobacco Use   • Smoking status: Former     Current packs/day: 0.00     Types: Cigarettes     Quit date: 2024     Years since quittin.9   Vaping Use   • Vaping status: Every Day   • Substances: Nicotine   • Devices: Disposable   Substance and Sexual Activity   • Alcohol use: Not Currently   • Drug use: Not Currently     Comment: narcotics- sober almost a year   • Sexual activity: Defer     Current Outpatient Medications:   •  buprenorphine-naloxone (SUBOXONE) 8-2 MG film film, Place 2 films under the tongue Daily., Disp: , Rfl:     No Known Allergies    The following portions of the patient's history were reviewed and updated as appropriate: allergies, current medications, past family history, past medical history, past social history, past surgical history and problem list.    Objective     Physical Exam  Constitutional:       General: She is not in acute distress.     Appearance: Normal appearance. She is well-developed. She is not diaphoretic.   HENT:      Head: Normocephalic and atraumatic.      Right Ear: External ear normal.      Left Ear: External ear normal.      Nose: Nose normal.   Eyes:      General: No scleral icterus.        Right eye: No discharge.         Left eye: No discharge.      Conjunctiva/sclera: " Conjunctivae normal.   Neck:      Trachea: No tracheal deviation.   Pulmonary:      Effort: Pulmonary effort is normal. No respiratory distress.   Musculoskeletal:         General: Normal range of motion.      Cervical back: Normal range of motion.   Skin:     Coloration: Skin is not pale.      Findings: No erythema or rash.      Comments: Tattoo on the back of her neck   Neurological:      Mental Status: She is alert and oriented to person, place, and time.      Coordination: Coordination normal.   Psychiatric:         Behavior: Behavior normal.         Thought Content: Thought content normal.         Judgment: Judgment normal.      Comments: Anxious affect         Vitals:    01/24/25 1128   BP: 99/63   Pulse: 65   SpO2: 97%   Weight: 46.4 kg (102 lb 6.4 oz)     Results Review:   I have reviewed the patient's new clinical and imaging results.    • Vitamin B-12 11/01/2024 533  211 - 946 pg/mL Final   • Folate 11/01/2024 8.36  4.78 - 24.20 ng/mL Final   • Methylmalonic Acid 11/01/2024 172  0 - 378 nmol/L Final   • MARTIN Direct 11/01/2024 Negative  Negative Final   • TSH 11/01/2024 0.959  0.270 - 4.200 uIU/mL Final   • Hemoglobin A1C 11/01/2024 4.80  4.80 - 5.60 % Final   • 25 Hydroxy, Vitamin D 11/01/2024 23.0 (L)  30.0 - 100.0 ng/ml Final   • Fentanyl, Urine 11/01/2024 Negative  Negative Final      Labs 9-2023 hepatitis C genotype 1, HCV ,899, AST 44, ALT 51, alkaline phosphatase 68, total bilirubin 0.3.    Labs 10/2/2024 hepatitis B surface antigen negative, hepatitis B surface antibody qualitative reactive, hepatitis B core antibody total positive, hepatitis C antibody positive, hepatitis A total antibody positive, hepatitis B core total antibody positive.    CTAP 5-2022  Findings:  The lung bases are clear.  The liver enhances homogeneously. Calcified stones in the gallbladder.  There is no biliary dilatation. The portal and splenic veins appear patent.  The spleen, pancreas, and adrenal glands are  unremarkable.  Areas of wedge-shaped hypoattenuation extending to the cortex in both kidneys.  No hydronephrosis. Small hypodense bilateral renal cysts.  No bowel obstruction, pneumoperitoneum, or pneumatosis. The appendix is normal.  The abdominal aorta is not aneurysmal. There are no enlarged abdominal or pelvic lymph nodes.  The urinary bladder and uterus are unremarkable.  Probable tampon in the vaginal cavity.  Mild pelvic free fluid may be physiologic.  There are no aggressive osseous lesions.  IMPRESSION:  1. Infectious bilateral lobar nephronia. No hydronephrosis.  2. Cholelithiasis.    Assessment / Plan      1. Chronic hepatitis C without hepatic coma  2. Liver fibrosis  3. History of illicit drug use  She has history of chronic Hepatitis C infection, treatment experienced (8/12 weeks of Epclusa in 2023 prescribed by ), without known cirrhosis. No recent HCV RNA lab on file to review. FibroScan completed at  9-2023 showed , E 9.5.  Most consistent with F3 advanced fibrosis.  No recent abdominal imaging.  Liver normal on CTAP in 2022.  Labs  showed hepatitis C antibody positive, hepatitis B surface antigen negative.  Based on prior history and prior hepatitis C treatment, will need to evaluate hepatitis C further to see if she responded to prior treatment.  If she does have advanced fibrosis, will need long-term management.    Labs today  May need further liver imaging after review of lab results  Continue to abstain from illicit drug use and alcohol use  If she does have advanced fibrosis, will need long-term monitoring in the GI clinic  She is immune to both hepatitis A&B, no vaccinations needed    - CBC (No Diff); Future  - Comprehensive Metabolic Panel; Future  - hCG, Serum, Qualitative; Future  - HCV FibroSURE; Future  - HCV RNA By PCR, Qn Rfx Yolande; Future  - Hepatitis A Antibody, Total; Future  - Hepatitis B Core Antibody, Total; Future  - Hepatitis B Surface Antibody; Future  -  Hepatitis B Surface Antigen; Future  - HIV-1 / O / 2 Ag / Antibody; Future  - Protime-INR; Future    Follow Up:   She will be called with interpretation of results to determine if any follow-up is needed.    Betina Garrido PA-C  [facility]  1/24/2025  13:51 EST    Dictated Utilizing Dragon Dictation: Part of this note may be an electronic transcription/translation of spoken language to printed text using the Dragon Dictation System.

## 2025-01-26 LAB
HAV AB SER QL IA: POSITIVE
HBV CORE AB SERPL QL IA: POSITIVE

## 2025-01-28 ENCOUNTER — TELEPHONE (OUTPATIENT)
Dept: GASTROENTEROLOGY | Facility: CLINIC | Age: 40
End: 2025-01-28
Payer: COMMERCIAL

## 2025-01-28 LAB
A2 MACROGLOB SERPL-MCNC: 360 MG/DL (ref 110–276)
ALT SERPL W P-5'-P-CCNC: 10 IU/L (ref 0–40)
APO A-I SERPL-MCNC: 113 MG/DL (ref 116–209)
BILIRUB SERPL-MCNC: 0.2 MG/DL (ref 0–1.2)
FIBROSIS SCORING:: ABNORMAL
FIBROSIS STAGE SERPL QL: ABNORMAL
GGT SERPL-CCNC: 5 IU/L (ref 0–60)
HAPTOGLOB SERPL-MCNC: 97 MG/DL (ref 33–278)
HCV AB SER QL: ABNORMAL
HCV GENTYP SERPL NAA+PROBE: NORMAL
HCV RNA SERPL NAA+PROBE-ACNC: NORMAL IU/ML
HCV RNA SERPL NAA+PROBE-LOG IU: NORMAL LOG10 IU/ML
LABORATORY COMMENT REPORT: ABNORMAL
LABORATORY COMMENT REPORT: NORMAL
LIVER FIBR SCORE SERPL CALC.FIBROSURE: 0.16 (ref 0–0.21)
NECROINFLAMM ACTIVITY SCORING:: ABNORMAL
NECROINFLAMMATORY ACT GRADE SERPL QL: ABNORMAL
NECROINFLAMMATORY ACT SCORE SERPL: 0.02 (ref 0–0.17)
SERVICE CMNT-IMP: ABNORMAL
TEST PERFORMANCE INFO SPEC: ABNORMAL

## 2025-01-29 ENCOUNTER — OFFICE VISIT (OUTPATIENT)
Dept: NEUROLOGY | Facility: CLINIC | Age: 40
End: 2025-01-29
Payer: COMMERCIAL

## 2025-01-29 VITALS
OXYGEN SATURATION: 98 % | HEART RATE: 68 BPM | HEIGHT: 60 IN | RESPIRATION RATE: 14 BRPM | BODY MASS INDEX: 21.48 KG/M2 | WEIGHT: 109.4 LBS | DIASTOLIC BLOOD PRESSURE: 62 MMHG | SYSTOLIC BLOOD PRESSURE: 108 MMHG | TEMPERATURE: 98.4 F

## 2025-01-29 DIAGNOSIS — M79.601 PARESTHESIA AND PAIN OF BOTH UPPER EXTREMITIES: Primary | ICD-10-CM

## 2025-01-29 DIAGNOSIS — R20.2 PARESTHESIA AND PAIN OF BOTH UPPER EXTREMITIES: Primary | ICD-10-CM

## 2025-01-29 DIAGNOSIS — M79.602 PARESTHESIA AND PAIN OF BOTH UPPER EXTREMITIES: Primary | ICD-10-CM

## 2025-01-29 DIAGNOSIS — G56.03 BILATERAL CARPAL TUNNEL SYNDROME: ICD-10-CM

## 2025-01-29 RX ORDER — GABAPENTIN 300 MG/1
300 CAPSULE ORAL 2 TIMES DAILY
Qty: 60 CAPSULE | Refills: 2 | Status: SHIPPED | OUTPATIENT
Start: 2025-01-29

## 2025-01-29 NOTE — PROGRESS NOTES
Follow Up Office Visit      Patient Name: Mavis Byrd  : 1985   MRN: [id number]     Chief Complaint:    Chief Complaint   Patient presents with    Follow-up     Paresthesia; pt states the pain has gotten significantly worse.        History of Present Illness: Mavis Byrd is a 39 y.o. female who is here today to follow up with neurology for paresthesia BUE. She was last seen in clinic  ([facility]).     She was sent for EMG/NCS and she was started on Gabapentin 300  mg QHS and she reports good tolerance and compliance with medication; she would like to increase medication dosing. She is using bilateral CTS braces.  She continues to co BUE peripheral neuropathy that has been present for years intermittently and has become constant x 9 months. R>L. Right hand dominate. Worse at night. She co dull throbbing ache and has to shake her hands awake or dangle her arms to make the sensation come back. Typing makes it worse. Previously tried and failed Amitriptyline.     Recent Imaging:     EMG/NCS  - Bilateral median nerves at the wrists exhibited increased cross sectional area (>20mm/2 on the right and>15mm/2 on the left) and hypoechoic echotexture     Pertinent Medical History: Hepatitis C, substance abuse    Subjective      Review of Systems:   Review of Systems   Constitutional: Negative.    HENT: Negative.     Eyes: Negative.    Respiratory: Negative.     Cardiovascular: Negative.    Gastrointestinal: Negative.    Endocrine: Negative.    Genitourinary: Negative.    Musculoskeletal: Negative.    Skin: Negative.    Allergic/Immunologic: Negative.    Neurological:  Positive for numbness.   Hematological: Negative.    Psychiatric/Behavioral: Negative.     All other systems reviewed and are negative.      I have reviewed and the following portions of the patient's history were updated as appropriate: past family history, past medical history, past social history, past surgical history and problem  "list.    Medications:     Current Outpatient Medications:     buprenorphine-naloxone (SUBOXONE) 8-2 MG film film, Place 2 films under the tongue Daily., Disp: , Rfl:     gabapentin (NEURONTIN) 300 MG capsule, Take 1 capsule by mouth 2 (Two) Times a Day., Disp: 60 capsule, Rfl: 2    Allergies:   No Known Allergies    Objective     Physical Exam:  Vital Signs:   Vitals:    01/29/25 1528   BP: 108/62   BP Location: Right arm   Patient Position: Sitting   Cuff Size: Adult   Pulse: 68   Resp: 14   Temp: 98.4 °F (36.9 °C)   TempSrc: Infrared   SpO2: 98%   Weight: 49.6 kg (109 lb 6.4 oz)   Height: 152.4 cm (60\")   PainSc:   8   PainLoc: Elbow  Comment: left     Body mass index is 21.37 kg/m².    Physical Exam  Vitals and nursing note reviewed.   Constitutional:       General: She is not in acute distress.     Appearance: Normal appearance.   HENT:      Head: Normocephalic.      Nose: Nose normal.      Mouth/Throat:      Mouth: Mucous membranes are moist.      Pharynx: Oropharynx is clear.   Eyes:      Extraocular Movements: Extraocular movements intact.      Conjunctiva/sclera: Conjunctivae normal.   Musculoskeletal:      Cervical back: Normal range of motion and neck supple.   Skin:     General: Skin is warm and dry.      Capillary Refill: Capillary refill takes less than 2 seconds.   Neurological:      Mental Status: She is alert and oriented to person, place, and time.      Sensory: Sensory deficit present.   Psychiatric:         Mood and Affect: Mood normal.         Behavior: Behavior normal.         Neurological Exam  Mental Status  Alert. Oriented to person, place, and time.    Cranial Nerves  CN III, IV, VI: Extraocular movements intact bilaterally.       Assessment / Plan      Assessment/Plan:   Diagnoses and all orders for this visit:    1. Paresthesia and pain of both upper extremities (Primary)  -     gabapentin (NEURONTIN) 300 MG capsule; Take 1 capsule by mouth 2 (Two) Times a Day.  Dispense: 60 capsule; " Refill: 2  -     Ambulatory Referral to Orthopedic Surgery    2. Bilateral carpal tunnel syndrome  -     Ambulatory Referral to Orthopedic Surgery         Follow Up:   Return in about 3 months (around 4/29/2025), or if symptoms worsen or fail to improve.    Anticipatory guidance and safety reviewed  Patient education provided  EDMOND # [id number] reviewed and appropriate  CDA- on file  Continue/increase Gabapentin 300 mg BID; SE reviewed.  Encouraged continued use of bilateral upper extremity carpal tunnel support braces  Reviewed EMG/NCS and discussed  Orthopedics Referral for CTS release vs injections  [place] Medical Appt Verification Form Completed     RTC PRN or within 12 weeks or sooner with issues     Regine Menchaca, DNP, APRN, FNP-C  [facility]

## 2025-02-05 ENCOUNTER — OFFICE VISIT (OUTPATIENT)
Dept: ORTHOPEDIC SURGERY | Facility: CLINIC | Age: 40
End: 2025-02-05
Payer: COMMERCIAL

## 2025-02-05 VITALS
DIASTOLIC BLOOD PRESSURE: 58 MMHG | SYSTOLIC BLOOD PRESSURE: 98 MMHG | WEIGHT: 106 LBS | BODY MASS INDEX: 20.81 KG/M2 | HEIGHT: 60 IN

## 2025-02-05 DIAGNOSIS — M25.531 PAIN OF BOTH WRIST JOINTS: Primary | ICD-10-CM

## 2025-02-05 DIAGNOSIS — G56.03 CARPAL TUNNEL SYNDROME, BILATERAL: ICD-10-CM

## 2025-02-05 DIAGNOSIS — G56.23 CUBITAL TUNNEL SYNDROME, BILATERAL: ICD-10-CM

## 2025-02-05 DIAGNOSIS — M25.532 PAIN OF BOTH WRIST JOINTS: Primary | ICD-10-CM

## 2025-02-05 PROCEDURE — 1159F MED LIST DOCD IN RCRD: CPT | Performed by: PHYSICIAN ASSISTANT

## 2025-02-05 PROCEDURE — 99204 OFFICE O/P NEW MOD 45 MIN: CPT | Performed by: PHYSICIAN ASSISTANT

## 2025-02-05 PROCEDURE — 1160F RVW MEDS BY RX/DR IN RCRD: CPT | Performed by: PHYSICIAN ASSISTANT

## 2025-02-05 NOTE — PROGRESS NOTES
"Subjective   Patient ID: Mavis Byrd is a 39 y.o. right hand dominant female   Numbness and Pain of the Left Wrist (Reports pain and numbness both wrists and elbows, left worse, ongoing for about 7 years, no treatment) and Numbness and Pain of the Right Wrist         History of Present Illness     Patient presents as a new patient to our clinic for the evaluation of bilateral upper extremity numbness and tingling ongoing for several years.  Currently, the left upper extremity is more symptomatic than her right.    There has been no injury or trauma.  She states the symptoms are affecting her ability to drive and sleep.  The symptoms seem to be worsening over the last 9 months.  At the current time, the left upper extremity numbness and tingling is more bothersome than her right hand.  She has tried gabapentin and carpal tunnel wrist bracing with marginal relief.      Past Medical History:   Diagnosis Date    Hepatitis C         History reviewed. No pertinent surgical history.    Family History   Problem Relation Age of Onset    Seizures Mother     Dementia Maternal Grandmother         Social History     Socioeconomic History    Marital status: Single   Tobacco Use    Smoking status: Former     Current packs/day: 0.00     Types: Cigarettes     Quit date: 2024     Years since quittin.9    Smokeless tobacco: Current   Vaping Use    Vaping status: Every Day    Substances: Nicotine    Devices: Disposable   Substance and Sexual Activity    Alcohol use: Not Currently    Drug use: Not Currently     Comment: narcotics- sober almost a year    Sexual activity: Defer       No Known Allergies    Review of Systems   Musculoskeletal:  Positive for arthralgias (bilateral wrist, bilateral elbow).   Neurological:  Positive for weakness (leyda hand) and numbness (BUE).       Objective   BP 98/58 (BP Location: Left arm, Patient Position: Sitting, Cuff Size: Adult)   Ht 152.4 cm (60\")   Wt 48.1 kg (106 lb)   BMI 20.70 kg/m² "   Physical Exam  Vitals and nursing note reviewed.   Constitutional:       Appearance: Normal appearance.   Cardiovascular:      Rate and Rhythm: Normal rate.   Pulmonary:      Effort: Pulmonary effort is normal.   Abdominal:      General: There is no distension.   Musculoskeletal:      Right wrist: No deformity, bony tenderness, snuff box tenderness or crepitus. Normal pulse.      Left wrist: No snuff box tenderness or crepitus. Normal pulse.      Right hand: No deformity. Normal range of motion.  of the median distribution. Normal capillary refill.      Left hand: No deformity. Normal range of motion. Decreased sensation of the ulnar distribution and median distribution. Normal capillary refill.   Neurological:      Mental Status: She is alert and oriented to person, place, and time.   Psychiatric:         Behavior: Behavior normal.       Ortho Exam    Neurological Exam  Mental Status  Alert. Oriented to person, place, and time.  Positive Tinel's sign to the left elbow and left distal wrist.  Positive Tinel sign right wrist.    There is no DRUJ instability to the bilateral wrist    Assessment & Plan   Independent Review of Radiographic Studies:    X-ray of the bilateral wrist 3 view each performed in the clinic independently reviewed for the evaluation of pain.  No comparison films available to review.  No acute fracture or dislocation.  The left wrist reveals cystic changes, the capital lunate space measures 2.9 mm.    I have personally reviewed the EMG results performed on 12/10/2024 from [facility].  The EMG is under the media tab.  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  Procedures      Diagnoses and all orders for this  visit:    1. Pain of both wrist joints (Primary)  -     XR Wrist 3+ View Bilateral; Future    2. Carpal tunnel syndrome, bilateral    3. Cubital tunnel syndrome, bilateral       Orthopedic activities reviewed and patient expressed appreciation  Risk, benefits, and merits of treatment alternatives reviewed with the patient and questions answered  The nature of the proposed surgery reviewed with the patient including risks, benefits, rehabilitation, recovery timeframe, and outcome expectations  Use brace as instructed    Recommendations/Plan:  Surgery: Surgery proposed at this visit as noted.  Patient is encouraged to call or return for any issues or concerns.  We discussed the treatment options available for carpal tunnel syndrome as well as left upper extremity cubital tunnel syndrome as she is symptomatic in the left cubital tunnel region.  We discussed treatment options in the form of splinting both the left elbow and bilateral wrist as well as cortisone injection for bilateral carpal tunnel syndrome.  We discussed the risks and benefits associated with injections.  After considering the injections are not 100% effective at long-term relief she would like to proceed with a more definitive fix in the form of carpal tunnel and cubital tunnel release.  Patient states her left upper extremity paresthesias and arthralgias are more bothersome at this time than her right wrist.  She would like to proceed with LEFT carpal tunnel and cubital tunnel release.  Patient endorses that she is in a drug court program.  I did sign a form stating that she was seen today and that no medications were provided.  I would like to treat her postop pain with prescription anti-inflammatory for bleeding Celebrex.    PLAN: Left carpal tunnel and cubital tunnel release    Patient agreeable to call or return sooner for any concerns.    BMI is within normal parameters. No other follow-up for BMI required.

## 2025-02-05 NOTE — H&P (VIEW-ONLY)
Subjective   Patient ID: Mavis Byrd is a 39 y.o. right hand dominant female   Numbness and Pain of the Left Wrist (Reports pain and numbness both wrists and elbows, left worse, ongoing for about 7 years, no treatment) and Numbness and Pain of the Right Wrist         History of Present Illness     Patient presents as a new patient to our clinic for the evaluation of bilateral upper extremity numbness and tingling ongoing for several years.  Currently, the left upper extremity is more symptomatic than her right.    There has been no injury or trauma.  She states the symptoms are affecting her ability to drive and sleep.  The symptoms seem to be worsening over the last 9 months.  At the current time, the left upper extremity numbness and tingling is more bothersome than her right hand.  She has tried gabapentin and carpal tunnel wrist bracing with marginal relief.      Past Medical History:   Diagnosis Date    Hepatic fibrosis     see note [facility] -    Hepatitis C     history of; followed by GI    History of substance use     methamphetamine - last use     Wears partial dentures     upper only        Past Surgical History:   Procedure Laterality Date    NO PAST SURGERIES         Family History   Problem Relation Age of Onset    Seizures Mother     Dementia Maternal Grandmother         Social History     Socioeconomic History    Marital status: Single   Tobacco Use    Smoking status: Former     Current packs/day: 0.00     Types: Cigarettes     Quit date: 2024     Years since quittin.9    Smokeless tobacco: Current   Vaping Use    Vaping status: Every Day    Substances: Nicotine    Devices: Disposable   Substance and Sexual Activity    Alcohol use: Not Currently    Drug use: Not Currently     Types: Methamphetamines     Comment: last use  since ; currently on suboxone    Sexual activity: Defer       No Known Allergies    Review of Systems   Musculoskeletal:  Positive for arthralgias (bilateral  "wrist, bilateral elbow).   Neurological:  Positive for weakness (leyda hand) and numbness (BUE).       Objective   BP 98/58 (BP Location: Left arm, Patient Position: Sitting, Cuff Size: Adult)   Ht 152.4 cm (60\")   Wt 48.1 kg (106 lb)   BMI 20.70 kg/m²   Physical Exam  Vitals and nursing note reviewed.   Constitutional:       Appearance: Normal appearance.   Cardiovascular:      Rate and Rhythm: Normal rate.   Pulmonary:      Effort: Pulmonary effort is normal.   Abdominal:      General: There is no distension.   Musculoskeletal:      Right wrist: No deformity, bony tenderness, snuff box tenderness or crepitus. Normal pulse.      Left wrist: No snuff box tenderness or crepitus. Normal pulse.      Right hand: No deformity. Normal range of motion.  of the median distribution. Normal capillary refill.      Left hand: No deformity. Normal range of motion. Decreased sensation of the ulnar distribution and median distribution. Normal capillary refill.   Neurological:      Mental Status: She is alert and oriented to person, place, and time.   Psychiatric:         Behavior: Behavior normal.       Ortho Exam    Neurological Exam  Mental Status  Alert. Oriented to person, place, and time.  Positive Tinel's sign to the left elbow and left distal wrist.  Positive Tinel sign right wrist.    There is no DRUJ instability to the bilateral wrist    Assessment & Plan   Independent Review of Radiographic Studies:    X-ray of the bilateral wrist 3 view each performed in the clinic independently reviewed for the evaluation of pain.  No comparison films available to review.  No acute fracture or dislocation.  The left wrist reveals cystic changes, the capital lunate space measures 2.9 mm.    I have personally reviewed the EMG results performed on 12/10/2024 from [facility].  The EMG is under the media tab.  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  Procedures      Diagnoses and all orders for this visit:    1. Pain of both wrist joints (Primary)  -     XR Wrist 3+ View Bilateral; Future    2. Carpal tunnel syndrome, bilateral  -     celecoxib (CeleBREX) 200 MG capsule; Take 1 capsule by mouth twice daily x 2 days, then only take 1 cap daily as needed for pain  Dispense: 14 capsule; Refill: 0    3. Cubital tunnel syndrome, bilateral  -     celecoxib (CeleBREX) 200 MG capsule; Take 1 capsule by mouth twice daily x 2 days, then only take 1 cap daily as needed for pain  Dispense: 14 capsule; Refill: 0       Orthopedic activities reviewed and patient expressed appreciation  Risk, benefits, and merits of treatment alternatives reviewed with the patient and questions answered  The nature of the proposed surgery reviewed with the patient including risks, benefits, rehabilitation, recovery timeframe, and outcome expectations  Use brace as instructed    Recommendations/Plan:  Surgery: Surgery proposed at this visit as noted.  Patient is encouraged to call or return for any issues or concerns.  We discussed the treatment options available for carpal tunnel syndrome as well as left upper extremity cubital tunnel syndrome as she is symptomatic in the left cubital tunnel region.  We discussed treatment options in the form of splinting both the left elbow and bilateral wrist as well as cortisone injection for bilateral carpal tunnel syndrome.  We discussed the risks and benefits associated with injections.  After considering the injections are not 100% effective at long-term relief she would like to proceed with a more definitive fix in the form of carpal tunnel and cubital tunnel release.  Patient states her left upper extremity paresthesias and arthralgias are more bothersome  at this time than her right wrist.  She would like to proceed with LEFT carpal tunnel and cubital tunnel release.  Patient endorses that she is in a drug court program.  I did sign a form stating that she was seen today and that no medications were provided.  I would like to treat her postop pain with prescription anti-inflammatory -Celebrex.    PLAN: Left carpal tunnel and cubital tunnel release    Patient agreeable to call or return sooner for any concerns.    BMI is within normal parameters. No other follow-up for BMI required.

## 2025-02-06 ENCOUNTER — PREP FOR SURGERY (OUTPATIENT)
Dept: OTHER | Facility: HOSPITAL | Age: 40
End: 2025-02-06
Payer: COMMERCIAL

## 2025-02-06 DIAGNOSIS — G56.03 CARPAL TUNNEL SYNDROME, BILATERAL: Primary | ICD-10-CM

## 2025-02-06 DIAGNOSIS — G56.23 CUBITAL TUNNEL SYNDROME, BILATERAL: ICD-10-CM

## 2025-02-06 RX ORDER — FAMOTIDINE 10 MG/ML
20 INJECTION, SOLUTION INTRAVENOUS
OUTPATIENT
Start: 2025-02-07 | End: 2025-02-08

## 2025-02-10 ENCOUNTER — HOSPITAL ENCOUNTER (OUTPATIENT)
Dept: GENERAL RADIOLOGY | Facility: HOSPITAL | Age: 40
Discharge: HOME | End: 2025-02-10
Payer: COMMERCIAL

## 2025-02-10 ENCOUNTER — PRE-ADMISSION TESTING (OUTPATIENT)
Dept: PREADMISSION TESTING | Facility: HOSPITAL | Age: 40
End: 2025-02-10
Payer: COMMERCIAL

## 2025-02-10 VITALS — WEIGHT: 105.4 LBS | HEIGHT: 59 IN | BODY MASS INDEX: 21.25 KG/M2

## 2025-02-10 DIAGNOSIS — G56.23 CUBITAL TUNNEL SYNDROME, BILATERAL: ICD-10-CM

## 2025-02-10 DIAGNOSIS — G56.03 CARPAL TUNNEL SYNDROME, BILATERAL: ICD-10-CM

## 2025-02-10 LAB
ALBUMIN SERPL-MCNC: 4.3 G/DL (ref 3.5–5.2)
ALBUMIN/GLOB SERPL: 1.6 G/DL
ALP SERPL-CCNC: 66 U/L (ref 39–117)
ALT SERPL W P-5'-P-CCNC: 9 U/L (ref 1–33)
ANION GAP SERPL CALCULATED.3IONS-SCNC: 10.2 MMOL/L (ref 5–15)
AST SERPL-CCNC: 19 U/L (ref 1–32)
B-HCG UR QL: NEGATIVE
BASOPHILS # BLD AUTO: 0.03 10*3/MM3 (ref 0–0.2)
BASOPHILS NFR BLD AUTO: 0.9 % (ref 0–1.5)
BILIRUB SERPL-MCNC: 0.3 MG/DL (ref 0–1.2)
BILIRUB UR QL STRIP: NEGATIVE
BUN SERPL-MCNC: 8 MG/DL (ref 6–20)
BUN/CREAT SERPL: 14.5 (ref 7–25)
CALCIUM SPEC-SCNC: 8.9 MG/DL (ref 8.6–10.5)
CHLORIDE SERPL-SCNC: 105 MMOL/L (ref 98–107)
CLARITY UR: CLEAR
CO2 SERPL-SCNC: 23.8 MMOL/L (ref 22–29)
COLOR UR: YELLOW
CREAT SERPL-MCNC: 0.55 MG/DL (ref 0.57–1)
DEPRECATED RDW RBC AUTO: 38.4 FL (ref 37–54)
EGFRCR SERPLBLD CKD-EPI 2021: 119.7 ML/MIN/1.73
EOSINOPHIL # BLD AUTO: 0.13 10*3/MM3 (ref 0–0.4)
EOSINOPHIL NFR BLD AUTO: 3.7 % (ref 0.3–6.2)
ERYTHROCYTE [DISTWIDTH] IN BLOOD BY AUTOMATED COUNT: 12.3 % (ref 12.3–15.4)
GLOBULIN UR ELPH-MCNC: 2.7 GM/DL
GLUCOSE SERPL-MCNC: 77 MG/DL (ref 65–99)
GLUCOSE UR STRIP-MCNC: NEGATIVE MG/DL
HCT VFR BLD AUTO: 33.3 % (ref 34–46.6)
HGB BLD-MCNC: 11.4 G/DL (ref 12–15.9)
HGB UR QL STRIP.AUTO: NEGATIVE
IMM GRANULOCYTES # BLD AUTO: 0.03 10*3/MM3 (ref 0–0.05)
IMM GRANULOCYTES NFR BLD AUTO: 0.9 % (ref 0–0.5)
KETONES UR QL STRIP: NEGATIVE
LEUKOCYTE ESTERASE UR QL STRIP.AUTO: NEGATIVE
LYMPHOCYTES # BLD AUTO: 1.05 10*3/MM3 (ref 0.7–3.1)
LYMPHOCYTES NFR BLD AUTO: 30.1 % (ref 19.6–45.3)
MCH RBC QN AUTO: 29.3 PG (ref 26.6–33)
MCHC RBC AUTO-ENTMCNC: 34.2 G/DL (ref 31.5–35.7)
MCV RBC AUTO: 85.6 FL (ref 79–97)
MONOCYTES # BLD AUTO: 0.36 10*3/MM3 (ref 0.1–0.9)
MONOCYTES NFR BLD AUTO: 10.3 % (ref 5–12)
NEUTROPHILS NFR BLD AUTO: 1.89 10*3/MM3 (ref 1.7–7)
NEUTROPHILS NFR BLD AUTO: 54.1 % (ref 42.7–76)
NITRITE UR QL STRIP: NEGATIVE
NRBC BLD AUTO-RTO: 0 /100 WBC (ref 0–0.2)
PH UR STRIP.AUTO: 5.5 [PH] (ref 5–8)
PLATELET # BLD AUTO: 172 10*3/MM3 (ref 140–450)
PMV BLD AUTO: 10.6 FL (ref 6–12)
POTASSIUM SERPL-SCNC: 4.1 MMOL/L (ref 3.5–5.2)
PROT SERPL-MCNC: 7 G/DL (ref 6–8.5)
PROT UR QL STRIP: NEGATIVE
RBC # BLD AUTO: 3.89 10*6/MM3 (ref 3.77–5.28)
SODIUM SERPL-SCNC: 139 MMOL/L (ref 136–145)
SP GR UR STRIP: 1.02 (ref 1–1.03)
UROBILINOGEN UR QL STRIP: NORMAL
WBC NRBC COR # BLD AUTO: 3.49 10*3/MM3 (ref 3.4–10.8)

## 2025-02-10 PROCEDURE — 87081 CULTURE SCREEN ONLY: CPT

## 2025-02-10 PROCEDURE — 81003 URINALYSIS AUTO W/O SCOPE: CPT

## 2025-02-10 PROCEDURE — 36415 COLL VENOUS BLD VENIPUNCTURE: CPT

## 2025-02-10 PROCEDURE — 93005 ELECTROCARDIOGRAM TRACING: CPT

## 2025-02-10 PROCEDURE — 85025 COMPLETE CBC W/AUTO DIFF WBC: CPT

## 2025-02-10 PROCEDURE — 71046 X-RAY EXAM CHEST 2 VIEWS: CPT

## 2025-02-10 PROCEDURE — 80053 COMPREHEN METABOLIC PANEL: CPT

## 2025-02-10 PROCEDURE — 81025 URINE PREGNANCY TEST: CPT

## 2025-02-10 RX ORDER — IBUPROFEN 800 MG/1
800 TABLET, FILM COATED ORAL AS NEEDED
COMMUNITY
End: 2025-02-12 | Stop reason: ALTCHOICE

## 2025-02-10 NOTE — DISCHARGE INSTRUCTIONS
Pre-Admission testing appointment completed today for patient's upcoming procedure on 02/13/25 at [facility].     PAT PASS reviewed with patient and they verbalize understanding of the following:     Do not eat or drink anything after midnight the night before procedure unless otherwise instructed by physician/surgeon's office, this includes no gum, candy, mints, tobacco products or e-cigarettes.  Do not shave the area to be operated on at least 48 hours prior to procedure.  Do not wear makeup, lotion, hair products, or nail polish.  Do not wear any jewelry and remove all piercings.  Do not wear any adhesive if you wear dentures.  Do not wear contacts; bring in glasses if needed.  Only take medications on the morning of procedure as instructed by PAT nurse per anesthesia guidelines or as instructed by physician's office.  If you are on any blood thinners reach out to the physician/surgeon's office for instructions on when/if they will need to be stopped prior to procedure.  Bring in picture ID and insurance card, advanced directive copies if applicable, CPAP/BIPAP/Inhalers if indicated morning of procedure, leave any other valuables at home.  Ensure you have arranged for someone to drive you home the day of your procedure and someone to care for you at home afterwards. It is recommended that you do not drive, drink alcohol, or make any major legal decisions for at least 24 hours after your procedure is complete.   Chlorhexidine sponge along with instruction/information sheet given to patient. Readybath wipes given in lieu of CHG if patient has CHG allergy. Instructed patient to date, time, and initial the verification sheet once skin prep has been completed, and to return to [facility] the day of the procedure.   ERAS instructions given to patient unless otherwise instructed per surgeon's orders.       Instructions and information given to patient about parking, hospital entrance, and registration  location.

## 2025-02-11 LAB — MRSA SPEC QL CULT: NORMAL

## 2025-02-13 ENCOUNTER — ANESTHESIA EVENT (OUTPATIENT)
Dept: PERIOP | Facility: HOSPITAL | Age: 40
End: 2025-02-13
Payer: COMMERCIAL

## 2025-02-13 ENCOUNTER — ANESTHESIA (OUTPATIENT)
Dept: PERIOP | Facility: HOSPITAL | Age: 40
End: 2025-02-13
Payer: COMMERCIAL

## 2025-02-13 ENCOUNTER — HOSPITAL ENCOUNTER (OUTPATIENT)
Facility: HOSPITAL | Age: 40
Setting detail: HOSPITAL OUTPATIENT SURGERY
Discharge: HOME | End: 2025-02-13
Attending: ORTHOPAEDIC SURGERY | Admitting: ORTHOPAEDIC SURGERY
Payer: COMMERCIAL

## 2025-02-13 VITALS
HEART RATE: 73 BPM | DIASTOLIC BLOOD PRESSURE: 79 MMHG | RESPIRATION RATE: 12 BRPM | SYSTOLIC BLOOD PRESSURE: 112 MMHG | TEMPERATURE: 97.2 F | OXYGEN SATURATION: 100 %

## 2025-02-13 DIAGNOSIS — G56.03 CARPAL TUNNEL SYNDROME, BILATERAL: ICD-10-CM

## 2025-02-13 DIAGNOSIS — G56.23 CUBITAL TUNNEL SYNDROME, BILATERAL: ICD-10-CM

## 2025-02-13 PROCEDURE — 25010000002 DEXAMETHASONE PER 1 MG: Performed by: NURSE ANESTHETIST, CERTIFIED REGISTERED

## 2025-02-13 PROCEDURE — 25010000002 CEFAZOLIN PER 500 MG: Performed by: NURSE ANESTHETIST, CERTIFIED REGISTERED

## 2025-02-13 PROCEDURE — 64718 REVISE ULNAR NERVE AT ELBOW: CPT | Performed by: ORTHOPAEDIC SURGERY

## 2025-02-13 PROCEDURE — 25010000002 CEFAZOLIN PER 500 MG: Performed by: PHYSICIAN ASSISTANT

## 2025-02-13 PROCEDURE — 25010000002 KETOROLAC TROMETHAMINE PER 15 MG: Performed by: NURSE ANESTHETIST, CERTIFIED REGISTERED

## 2025-02-13 PROCEDURE — 25010000002 ONDANSETRON PER 1 MG: Performed by: NURSE ANESTHETIST, CERTIFIED REGISTERED

## 2025-02-13 PROCEDURE — 25010000002 ROPIVACAINE PER 1 MG: Performed by: ORTHOPAEDIC SURGERY

## 2025-02-13 PROCEDURE — 25010000002 PROPOFOL 200 MG/20ML EMULSION: Performed by: NURSE ANESTHETIST, CERTIFIED REGISTERED

## 2025-02-13 PROCEDURE — 64721 CARPAL TUNNEL SURGERY: CPT | Performed by: ORTHOPAEDIC SURGERY

## 2025-02-13 PROCEDURE — 25010000002 FENTANYL CITRATE (PF) 50 MCG/ML SOLUTION PREFILLED SYRINGE: Performed by: NURSE ANESTHETIST, CERTIFIED REGISTERED

## 2025-02-13 PROCEDURE — 25010000002 CEFAZOLIN PER 500 MG: Performed by: ORTHOPAEDIC SURGERY

## 2025-02-13 PROCEDURE — 25810000003 LACTATED RINGERS PER 1000 ML: Performed by: ORTHOPAEDIC SURGERY

## 2025-02-13 PROCEDURE — 25010000002 HYDROMORPHONE 1 MG/ML SOLUTION: Performed by: NURSE ANESTHETIST, CERTIFIED REGISTERED

## 2025-02-13 PROCEDURE — 25010000002 MIDAZOLAM PER 1MG: Performed by: NURSE ANESTHETIST, CERTIFIED REGISTERED

## 2025-02-13 RX ORDER — ONDANSETRON 2 MG/ML
INJECTION INTRAMUSCULAR; INTRAVENOUS AS NEEDED
Status: DISCONTINUED | OUTPATIENT
Start: 2025-02-13 | End: 2025-02-13 | Stop reason: SURG

## 2025-02-13 RX ORDER — KETOROLAC TROMETHAMINE 30 MG/ML
INJECTION, SOLUTION INTRAMUSCULAR; INTRAVENOUS AS NEEDED
Status: DISCONTINUED | OUTPATIENT
Start: 2025-02-13 | End: 2025-02-13 | Stop reason: SURG

## 2025-02-13 RX ORDER — ROPIVACAINE HYDROCHLORIDE 5 MG/ML
INJECTION, SOLUTION EPIDURAL; INFILTRATION; PERINEURAL AS NEEDED
Status: DISCONTINUED | OUTPATIENT
Start: 2025-02-13 | End: 2025-02-13 | Stop reason: HOSPADM

## 2025-02-13 RX ORDER — MIDAZOLAM HYDROCHLORIDE 2 MG/2ML
INJECTION, SOLUTION INTRAMUSCULAR; INTRAVENOUS AS NEEDED
Status: DISCONTINUED | OUTPATIENT
Start: 2025-02-13 | End: 2025-02-13 | Stop reason: SURG

## 2025-02-13 RX ORDER — ONDANSETRON 2 MG/ML
4 INJECTION INTRAMUSCULAR; INTRAVENOUS ONCE AS NEEDED
Status: DISCONTINUED | OUTPATIENT
Start: 2025-02-13 | End: 2025-02-13 | Stop reason: HOSPADM

## 2025-02-13 RX ORDER — FENTANYL CITRATE 50 UG/ML
INJECTION, SOLUTION INTRAMUSCULAR; INTRAVENOUS AS NEEDED
Status: DISCONTINUED | OUTPATIENT
Start: 2025-02-13 | End: 2025-02-13 | Stop reason: SURG

## 2025-02-13 RX ORDER — CEFAZOLIN SODIUM 1 G/3ML
INJECTION, POWDER, FOR SOLUTION INTRAMUSCULAR; INTRAVENOUS AS NEEDED
Status: DISCONTINUED | OUTPATIENT
Start: 2025-02-13 | End: 2025-02-13 | Stop reason: SURG

## 2025-02-13 RX ORDER — PROCHLORPERAZINE EDISYLATE 5 MG/ML
10 INJECTION INTRAMUSCULAR; INTRAVENOUS ONCE AS NEEDED
Status: DISCONTINUED | OUTPATIENT
Start: 2025-02-13 | End: 2025-02-13 | Stop reason: HOSPADM

## 2025-02-13 RX ORDER — MEPERIDINE HYDROCHLORIDE 25 MG/ML
12.5 INJECTION INTRAMUSCULAR; INTRAVENOUS; SUBCUTANEOUS
Status: DISCONTINUED | OUTPATIENT
Start: 2025-02-13 | End: 2025-02-13 | Stop reason: HOSPADM

## 2025-02-13 RX ORDER — PROPOFOL 10 MG/ML
INJECTION, EMULSION INTRAVENOUS AS NEEDED
Status: DISCONTINUED | OUTPATIENT
Start: 2025-02-13 | End: 2025-02-13 | Stop reason: SURG

## 2025-02-13 RX ORDER — SODIUM CHLORIDE, SODIUM LACTATE, POTASSIUM CHLORIDE, CALCIUM CHLORIDE 600; 310; 30; 20 MG/100ML; MG/100ML; MG/100ML; MG/100ML
1000 INJECTION, SOLUTION INTRAVENOUS CONTINUOUS
Status: DISCONTINUED | OUTPATIENT
Start: 2025-02-13 | End: 2025-02-13 | Stop reason: HOSPADM

## 2025-02-13 RX ORDER — FAMOTIDINE 10 MG/ML
20 INJECTION, SOLUTION INTRAVENOUS
Status: DISCONTINUED | OUTPATIENT
Start: 2025-02-14 | End: 2025-02-13

## 2025-02-13 RX ORDER — PHENYLEPHRINE HCL IN 0.9% NACL 1 MG/10 ML
SYRINGE (ML) INTRAVENOUS AS NEEDED
Status: DISCONTINUED | OUTPATIENT
Start: 2025-02-13 | End: 2025-02-13 | Stop reason: SURG

## 2025-02-13 RX ORDER — DEXAMETHASONE SODIUM PHOSPHATE 4 MG/ML
INJECTION, SOLUTION INTRA-ARTICULAR; INTRALESIONAL; INTRAMUSCULAR; INTRAVENOUS; SOFT TISSUE AS NEEDED
Status: DISCONTINUED | OUTPATIENT
Start: 2025-02-13 | End: 2025-02-13 | Stop reason: SURG

## 2025-02-13 RX ORDER — FAMOTIDINE 10 MG/ML
20 INJECTION, SOLUTION INTRAVENOUS
Status: COMPLETED | OUTPATIENT
Start: 2025-02-13 | End: 2025-02-13

## 2025-02-13 RX ORDER — LIDOCAINE HCL/PF 100 MG/5ML
SYRINGE (ML) INJECTION AS NEEDED
Status: DISCONTINUED | OUTPATIENT
Start: 2025-02-13 | End: 2025-02-13 | Stop reason: SURG

## 2025-02-13 RX ORDER — IPRATROPIUM BROMIDE AND ALBUTEROL SULFATE 2.5; .5 MG/3ML; MG/3ML
3 SOLUTION RESPIRATORY (INHALATION) ONCE AS NEEDED
Status: DISCONTINUED | OUTPATIENT
Start: 2025-02-13 | End: 2025-02-13 | Stop reason: HOSPADM

## 2025-02-13 RX ADMIN — PROPOFOL 200 MG: 10 INJECTION, EMULSION INTRAVENOUS at 12:04

## 2025-02-13 RX ADMIN — FENTANYL CITRATE 50 MCG: 50 INJECTION, SOLUTION INTRAMUSCULAR; INTRAVENOUS at 11:59

## 2025-02-13 RX ADMIN — CEFAZOLIN 2 G: 1 INJECTION, POWDER, FOR SOLUTION INTRAMUSCULAR; INTRAVENOUS at 12:08

## 2025-02-13 RX ADMIN — FENTANYL CITRATE 50 MCG: 50 INJECTION, SOLUTION INTRAMUSCULAR; INTRAVENOUS at 12:18

## 2025-02-13 RX ADMIN — DEXAMETHASONE SODIUM PHOSPHATE 4 MG: 4 INJECTION, SOLUTION INTRA-ARTICULAR; INTRALESIONAL; INTRAMUSCULAR; INTRAVENOUS; SOFT TISSUE at 12:20

## 2025-02-13 RX ADMIN — MIDAZOLAM HYDROCHLORIDE 2 MG: 1 INJECTION, SOLUTION INTRAMUSCULAR; INTRAVENOUS at 11:59

## 2025-02-13 RX ADMIN — Medication 100 MCG: at 12:48

## 2025-02-13 RX ADMIN — Medication 50 MG: at 12:04

## 2025-02-13 RX ADMIN — HYDROMORPHONE HYDROCHLORIDE 0.5 MG: 0.5 INJECTION, SOLUTION INTRAMUSCULAR; INTRAVENOUS; SUBCUTANEOUS at 13:53

## 2025-02-13 RX ADMIN — SODIUM CHLORIDE 2 G: 9 INJECTION, SOLUTION INTRAVENOUS at 09:17

## 2025-02-13 RX ADMIN — KETOROLAC TROMETHAMINE 15 MG: 30 INJECTION, SOLUTION INTRAMUSCULAR; INTRAVENOUS at 12:55

## 2025-02-13 RX ADMIN — SODIUM CHLORIDE, POTASSIUM CHLORIDE, SODIUM LACTATE AND CALCIUM CHLORIDE 1000 ML: 600; 310; 30; 20 INJECTION, SOLUTION INTRAVENOUS at 09:17

## 2025-02-13 RX ADMIN — FAMOTIDINE 20 MG: 10 INJECTION, SOLUTION INTRAVENOUS at 09:16

## 2025-02-13 RX ADMIN — ONDANSETRON 4 MG: 2 INJECTION INTRAMUSCULAR; INTRAVENOUS at 12:20

## 2025-02-13 NOTE — INTERVAL H&P NOTE
H&P reviewed. The patient was examined and there are no changes to the H&P.    Vitals:    02/13/25 0847   BP: 102/50   Pulse: 79   Resp: 16   Temp: 98.4 °F (36.9 °C)   SpO2: 97%     Patient asked if the condition will completely recovery and we discussed that the procedures will relieve pressure on the nerves still we do not know for certain with chronic nerve compression how much recovery if any will occur.  Patient states she understands.   We discussed factors including patient age, and she is younger, and how long the nerve has been compressed with symptoms, and patient states she has had symptoms, actually of both of her arms and hands, for a long time.  Also, when she had the EMG she was told the findings suggests perhaps ten or more years of nerve compression and distress.  So in summary we discussed the procedures give the nerve the opportunity to be relieved of chronic compression permitting potential for nerve functional recovery and relief of pain, numbness and weakness, though not a guarantee.  Patient understands and wants to proceed with left carpal tunnel release and left cubital tunnel release.    Chema Capellan MD  2/13/2025  11:32 EST

## 2025-02-13 NOTE — ANESTHESIA PROCEDURE NOTES
Airway  Urgency: elective    Date/Time: 2/13/2025 12:05 PM    General Information and Staff    Patient location during procedure: OR  CRNA/CAA: Donita Zuñiga CRNA    Indications and Patient Condition  Indications for airway management: airway protection    Preoxygenated: yes  Mask difficulty assessment: 1 - vent by mask    Final Airway Details  Final airway type: supraglottic airway      Successful airway: unique  Size 4  Airway Seal Pressure (cm H2O): 20     Number of attempts at approach: 1  Assessment: lips, teeth, and gum same as pre-op    Additional Comments  LMA seats well

## 2025-02-13 NOTE — ANESTHESIA POSTPROCEDURE EVALUATION
Patient: Mavis Byrd    Procedure Summary       Date: 02/13/25 Room / Location: [facility] OR  /  [facility] OR    Anesthesia Start: 1155 Anesthesia Stop: 1314    Procedure: Left carpal tunnel and cubital tunnel release (Left: Wrist) Diagnosis:       Carpal tunnel syndrome, bilateral      Cubital tunnel syndrome, bilateral      (Carpal tunnel syndrome, bilateral [G56.03])      (Cubital tunnel syndrome, bilateral [G56.23])    Surgeons: Chema Capellan MD Provider: Donita Zuñiga CRNA    Anesthesia Type: general ASA Status: 3            Anesthesia Type: general    Vitals  Vitals Value Taken Time   /73 02/13/25 1410   Temp 97.1 °F (36.2 °C) 02/13/25 1317   Pulse 65 02/13/25 1417   Resp 11 02/13/25 1335   SpO2 98 % 02/13/25 1417   Vitals shown include unfiled device data.          Post Anesthesia Care and Evaluation    Patient location during evaluation: PACU  Patient participation: complete - patient participated  Level of consciousness: awake and alert  Pain score: 2  Pain management: satisfactory to patient    Airway patency: patent  Anesthetic complications: No anesthetic complications  PONV Status: none  Cardiovascular status: acceptable and stable  Respiratory status: acceptable  Hydration status: acceptable    Comments: Vitals signs as noted in nursing documentation as per protocol.

## 2025-02-13 NOTE — OP NOTE
[address], [address]  [place]  [address] [phone]      OPERATIVE REPORT           PATIENT NAME:   Mavis Byrd                            YOB: 1985      PREOP DIAGNOSIS:   Left Carpal Tunnel Syndrome and Cubital Tunnel Syndrome.    POSTOP DIAGNOSIS:  Left Carpal Tunnel Syndrome and Cubital Tunnel Syndrome.    PROCEDURE:     Left Carpal Tunnel Release and Cubital Tunnel Release Without Anterior Transposition of the Ulna Nerve.    SURGEON:    Chema Capellan MD    OPERATIVE TEAM:   Circulator: Andrea Oh RN  Scrub Person: Lynda De La Rosa; Bautista Vasquez Misty    ANESTHETIST:   CRNA: Donita Zuñiga CRNA    ANESTHESIA:   General plus local    ESTIM BLOOD LOSS:  10 ml    TOURNIQUET TIME:   18 minutes @ 250 mm Hg    SPECIMENS:     None.    DRAINS:    None.    COMPLICATIONS:     None.    DISPOSITION:     Stable to recovery.    INDICATIONS/NARRATIVE:    The patient presents for planned elective carpal tunnel release and cubital tunnel release surgery to address the ongoing elbow, wrist and hand condition.  Risks and benefits of the surgery were discussed and an informed consent obtained.  Risks were discussed including, but not limited to anesthesia, infection, nerve/vessel/tendon injury and persistent symptoms or limitations of the wrist and hand.  Goals include pain relief, improved sensation, strength, mobility and potential for improved function of the wrist and hand.       OPERATIVE PROCEDURE:  Antibiotic prophylaxis given.  Surgeon site marking and a time out were performed.  Anesthesia was effective and well tolerated.  The arm and hand was prepped and draped in the usual sterile fashion.       After sterile prep and drape and with tourniquet, a curved incision was made along the ulna border of the thenar crease of the hand.  Careful soft tissue dissection and blunt Ragnell retractors were used to protect the soft tissues and  expose the transverse carpal tunnel ligament.  While keeping a blunt freer under the ligament, the ligament was released with a 15 blade scalpel and a tenotomy scissors both proximally and distally, decompressing the median nerve and flexor tendons.  These structures could be easily visualized, and were intact within the carpal tunnel.  The wound was irrigated copiously with antibiotic solution.       Next, a 6 cm curved incision was made and centered posterior to the medial epicondlye. Branches of the medial antebrachial cutaneous nerve were identified and preserved during the subcutaneous dissection. The fascia over the flexor carpi ulnaris and Agapito’s ligament were released exposing the ulnar nerve posterior to the medial epicondlye within the cubital tunnel. The nerve was release distally for four centimeters and proximally as well for six to eight centimeters along the medial intermuscular septum and releasing the [place] proximally. The nerve could now stay in a relaxed position in situ throughout full elbow extension, flexion, pronation and supination. It was relaxed without tension through the range of elbow and arm motion.      The tourniquet was taken down and there was excellent hemostasis.  Minimal bleeding was controlled with electocautery and the wound was irrigated copiously throughout the procedure with antibiotic solution. Wrist skin closure consisted of interrupted 3-0 nylon mattress sutures.  Elbow skin closure consisted of interrupted 2-0 Vicryl subcutaneous sutures and steri-strips. A total of 10 ml (5 ml at the wrist and 5 ml at the elbow) of 0.5% plain ropivacaine was given for local anesthesia.  A sterile padded Aquacel dressing was applied at the wrist and at the elbow. Anesthesia was effective and well tolerated. There were no complications of the procedure. The patient was transferred in stable condition to the recovery room.

## 2025-02-13 NOTE — LETTER
February 13, 2025     Patient: Mavis Byrd   YOB: 1985   Date of Visit: 2/6/2025       To Whom It May Concern:    It is my medical opinion that Mavis Byrd {Work release (duty restriction):75361}.           Sincerely,        No name on file    CC: No Recipients

## 2025-02-13 NOTE — ANESTHESIA PREPROCEDURE EVALUATION
Anesthesia Evaluation     Patient summary reviewed and Nursing notes reviewed   no history of anesthetic complications:   NPO Solid Status: > 8 hours  NPO Liquid Status: > 8 hours           Airway   Mallampati: II  TM distance: >3 FB  Neck ROM: full  No difficulty expected  Dental    (+) upper dentures    Pulmonary - normal exam   (+) a smoker (quit in 2024) Former,    ROS comment: CXR: NAD   Cardiovascular - normal exam  Exercise tolerance: good (4-7 METS)    ECG reviewed      ROS comment: EKG: SR     Neuro/Psych  (+) numbness (carpal and cubital tunnel syndrome)  GI/Hepatic/Renal/Endo    (+) hepatitis C, liver disease (fibrosis)    Musculoskeletal (-) negative ROS    Abdominal    Substance History   (+) drug use (history of methampetamine use last use 2023)  (-) alcohol use     OB/GYN    (-)  Pregnant        Other - negative ROS       ROS/Med Hx Other: Pt on suboxone    11.4/33.3  K 4.1              Anesthesia Plan    ASA 3     general     (Risks and benefits discussed including risk of aspiration, recall and dental damage. All patient questions answered. Will continue with POC.)  intravenous induction     Anesthetic plan, risks, benefits, and alternatives have been provided, discussed and informed consent has been obtained with: patient.    Plan discussed with CRNA.    CODE STATUS:

## 2025-02-14 LAB
QT INTERVAL: 376 MS
QTC INTERVAL: 402 MS

## 2025-02-27 ENCOUNTER — OFFICE VISIT (OUTPATIENT)
Dept: ORTHOPEDIC SURGERY | Facility: CLINIC | Age: 40
End: 2025-02-27
Payer: COMMERCIAL

## 2025-02-27 VITALS
HEIGHT: 59 IN | TEMPERATURE: 97.1 F | WEIGHT: 105 LBS | DIASTOLIC BLOOD PRESSURE: 60 MMHG | BODY MASS INDEX: 21.17 KG/M2 | SYSTOLIC BLOOD PRESSURE: 116 MMHG

## 2025-02-27 DIAGNOSIS — Z98.890 S/P CARPAL TUNNEL RELEASE: Primary | ICD-10-CM

## 2025-02-27 DIAGNOSIS — Z98.890 S/P CUBITAL TUNNEL RELEASE: ICD-10-CM

## 2025-02-27 NOTE — PROGRESS NOTES
"Subjective   Patient ID: Mavis Byrd is a 39 y.o. right hand dominant female is here today for a post-operative visit.  Post-op of the Left Wrist (Left carpal tunnel and cubital tunnel release 2/13/25. Reports doing better but does reports a fall 3 days after surgery landing on left arm and hand.) and Post-op of the Left Elbow       CHIEF COMPLAINT:    Progress and recovery after surgery.    History of Present Illness      Pain controlled: [] no   [x] yes   Medication refill requested: [x] no   [] yes    Patient compliant with instructions: [] no   [x] yes   Other: Reports good progress since surgery.  Patient is pleased with her progress.  She still has some sensation of weakness to the left arm.  Patient endorses that she did slip on ice 3 days postop but did not feel like she truly injured her incision sites and therefore, did not come in for an evaluation.     Past Medical History:   Diagnosis Date    Hepatic fibrosis 2023    see note [facility] -    Hepatitis C 2023    history of; followed by GI    History of substance use     methamphetamine - last use 2023    Wears partial dentures     upper only        Past Surgical History:   Procedure Laterality Date    CARPAL TUNNEL RELEASE WITH CUBITAL TUNNEL RELEASE Left 2/13/2025    Procedure: Left carpal tunnel and cubital tunnel release;  Surgeon: Chema Capellan MD;  Location: [facility];  Service: Orthopedics;  Laterality: Left;    NO PAST SURGERIES         No Known Allergies        Objective   /60 (BP Location: Right arm, Patient Position: Sitting, Cuff Size: Adult)   Temp 97.1 °F (36.2 °C)   Ht 149.9 cm (59\")   Wt 47.6 kg (105 lb)   BMI 21.21 kg/m²       Signs of infection: [x] no                    [] yes   Drainage: [x] no                    [] yes   Incision: [x] healing well     []healed well   Motor exam intact: [] no                    [x] yes   Neurovascular exam intact: [] no                    [x] yes   Signs of compartment syndrome: [x] no       "              [] yes   Signs of DVT: [x] no                    [] yes   Other: The Steri-Strips to the left inner elbow were removed today.  New Steri-Strips were applied just for some added reinforcement.  The sutures of the left wrist were removed easily today in clinic.     Physical Exam  Ortho Exam    Neurological Exam    Assessment & Plan     Independent Review of Radiographic Studies:    No new imaging done today.    Laboratory and Other Studies:  No new results reviewed today.       Procedures     Diagnoses and all orders for this visit:    1. S/P carpal tunnel release (Primary)    2. S/P cubital tunnel release       Recommendations/Plan:     [] Staples    [x] Sutures [x] Removed today  [] At prior visit  [] Plan removal later   Physical therapy: D/w patient option of PT, however, she didn't feel it was necessary at this time. Will reassess her in 10 weeks and if she needs PT at that time, we will order PT     Ultrasound: [x]not ordered         []order given to patient   Labs: [x]not ordered         []order given to patient   Weight Bearing status: []Full []WBAT []PWB [x]NWB x 2 additional weeks       Discussion of orthopaedic goals and activities and patient expressed appreciation.  Regular exercise as tolerated  Guided on proper techniques for mobility and conditioning exercises  Weight bearing parameters reviewed  Take prescribed medications as instructed only as tolerated     Exercise, medications other patient advice, and return appointment as noted.    Return in about 10 weeks (around 5/8/2025).  Patient is encouraged and agreeable to call or return sooner for any issues or concerns.

## 2025-03-14 ENCOUNTER — TRANSCRIBE ORDERS (OUTPATIENT)
Dept: ADMINISTRATIVE | Facility: HOSPITAL | Age: 40
End: 2025-03-14
Payer: COMMERCIAL

## 2025-03-14 DIAGNOSIS — K74.02 ADVANCED HEPATIC FIBROSIS: Primary | ICD-10-CM

## 2025-04-25 ENCOUNTER — TELEMEDICINE (OUTPATIENT)
Dept: NEUROLOGY | Facility: CLINIC | Age: 40
End: 2025-04-25
Payer: COMMERCIAL

## 2025-04-25 DIAGNOSIS — G56.03 BILATERAL CARPAL TUNNEL SYNDROME: Primary | ICD-10-CM

## 2025-04-25 DIAGNOSIS — M79.601 PARESTHESIA AND PAIN OF BOTH UPPER EXTREMITIES: ICD-10-CM

## 2025-04-25 DIAGNOSIS — M79.602 PARESTHESIA AND PAIN OF BOTH UPPER EXTREMITIES: ICD-10-CM

## 2025-04-25 DIAGNOSIS — R20.2 PARESTHESIA AND PAIN OF BOTH UPPER EXTREMITIES: ICD-10-CM

## 2025-04-25 NOTE — PROGRESS NOTES
Follow Up Office Visit      Patient Name: Mavis Byrd  : 1985   MRN: [id number]     Chief Complaint:    Chief Complaint   Patient presents with    Follow-up     Paresthesia  Carpal tunnel    Numbness       History of Present Illness: Mavis Byrd is a 39 y.o. female who is here today to follow up with neurology for paresthesia to the bilateral upper extremities/ CTS.  She was last seen in clinic  (Meghan). This is a telehealth encounter.     She has been taking Gabapentin 300 mg BID and reports good tolerance and compliance. She feels medication has helped her symptoms of numbness and tingling. She did undergo L left carpal tunnel surgery with Orthopedics (QUINCY) 25 and has FU  for discussion on right carpal tunnel surgery.     Taken from previous encounter:    She was sent for EMG/NCS and she was started on Gabapentin 300  mg QHS and she reports good tolerance and compliance with medication; she would like to increase medication dosing. She is using bilateral CTS braces.  She continues to co BUE peripheral neuropathy that has been present for years intermittently and has become constant x 9 months. R>L. Right hand dominate. Worse at night. She co dull throbbing ache and has to shake her hands awake or dangle her arms to make the sensation come back. Typing makes it worse. Previously tried and failed Amitriptyline.     Recent Imaging:      EMG/NCS  - Bilateral median nerves at the wrists exhibited increased cross sectional area (>20mm/2 on the right and>15mm/2 on the left) and hypoechoic echotexture     Pertinent Medical History: Hepatitis C, substance abuse    Subjective      Review of Systems:   Review of Systems   Constitutional: Negative.    HENT: Negative.     Eyes: Negative.    Respiratory: Negative.     Cardiovascular: Negative.    Gastrointestinal: Negative.    Endocrine: Negative.    Genitourinary: Negative.    Musculoskeletal: Negative.    Skin: Negative.     Allergic/Immunologic: Negative.    Neurological:  Positive for numbness.   Hematological: Negative.    Psychiatric/Behavioral: Negative.     All other systems reviewed and are negative.      I have reviewed and the following portions of the patient's history were updated as appropriate: past family history, past medical history, past social history, past surgical history and problem list.    Medications:     Current Outpatient Medications:     buprenorphine-naloxone (SUBOXONE) 8-2 MG film film, Place 2 films under the tongue Daily., Disp: , Rfl:     gabapentin (NEURONTIN) 300 MG capsule, Take 1 capsule by mouth 2 (Two) Times a Day., Disp: 60 capsule, Rfl: 2    celecoxib (CeleBREX) 200 MG capsule, Take 1 capsule by mouth twice daily for 2 days, then only take 1 capsule once daily as needed for pain (Patient not taking: Reported on 4/25/2025), Disp: 14 capsule, Rfl: 0    Allergies:   No Known Allergies    Objective     Physical Exam:  Vital Signs: There were no vitals filed for this visit.  There is no height or weight on file to calculate BMI.    Physical Exam  Vitals and nursing note reviewed.   Constitutional:       General: She is not in acute distress.     Appearance: Normal appearance.   HENT:      Head: Normocephalic.      Nose: Nose normal.      Mouth/Throat:      Mouth: Mucous membranes are moist.      Pharynx: Oropharynx is clear.   Eyes:      Extraocular Movements: Extraocular movements intact.      Conjunctiva/sclera: Conjunctivae normal.   Musculoskeletal:      Cervical back: Normal range of motion.   Neurological:      Mental Status: She is alert and oriented to person, place, and time.   Psychiatric:         Mood and Affect: Mood normal.         Behavior: Behavior normal.         Neurological Exam  Mental Status  Alert. Oriented to person, place, and time.    Cranial Nerves  CN III, IV, VI: Extraocular movements intact bilaterally.       Assessment / Plan      Assessment/Plan:   Diagnoses and all  orders for this visit:    1. Bilateral carpal tunnel syndrome (Primary)    2. Paresthesia and pain of both upper extremities         Follow Up:   Return in about 6 months (around 10/25/2025), or if symptoms worsen or fail to improve.    Anticipatory guidance and safety reviewed  Patient education provided  EDMOND # [id number] reviewed and appropriate   CDA- on file  Continue/increase Gabapentin 300 mg BID; SE reviewed.  Encouraged continued use of bilateral upper extremity carpal tunnel support braces  Keep Orthopedics FU     RTC PRN or within 6 months or sooner with issues     Twilio Encounter for 21 min with >50% of encounter spent counseling and coordinating care     Regine Menchaca, DNP, APRN, FNP-C  [facility]

## 2025-05-07 ENCOUNTER — TRANSCRIBE ORDERS (OUTPATIENT)
Dept: PSYCHIATRY | Facility: CLINIC | Age: 40
End: 2025-05-07
Payer: COMMERCIAL

## 2025-05-21 ENCOUNTER — OFFICE VISIT (OUTPATIENT)
Dept: ORTHOPEDIC SURGERY | Facility: CLINIC | Age: 40
End: 2025-05-21
Payer: COMMERCIAL

## 2025-05-21 VITALS
WEIGHT: 106.8 LBS | SYSTOLIC BLOOD PRESSURE: 96 MMHG | DIASTOLIC BLOOD PRESSURE: 70 MMHG | HEIGHT: 59 IN | BODY MASS INDEX: 21.53 KG/M2

## 2025-05-21 DIAGNOSIS — G56.03 CARPAL TUNNEL SYNDROME, BILATERAL: ICD-10-CM

## 2025-05-21 DIAGNOSIS — G56.23 CUBITAL TUNNEL SYNDROME, BILATERAL: ICD-10-CM

## 2025-05-21 DIAGNOSIS — Z98.890 S/P CUBITAL TUNNEL RELEASE: ICD-10-CM

## 2025-05-21 DIAGNOSIS — Z98.890 S/P CARPAL TUNNEL RELEASE: Primary | ICD-10-CM

## 2025-05-21 RX ORDER — BACLOFEN 10 MG/1
TABLET ORAL
COMMUNITY
Start: 2025-05-19

## 2025-05-21 RX ORDER — VALACYCLOVIR HYDROCHLORIDE 500 MG/1
TABLET, FILM COATED ORAL
COMMUNITY
Start: 2025-05-05

## 2025-05-21 NOTE — PROGRESS NOTES
Subjective   Patient ID: Mavis Byrd is a 39 y.o. right hand dominant female  Follow-up of the Left Wrist (Status post Left carpal tunnel and cubital tunnel release 25. Reports arm is doing really well. )         History of Present Illness  Patient is following up for scheduled visit status post left carpal tunnel and cubital tunnel release.  Previous surgery 2025.  She states the left arm is doing reasonably well.  She does endorse 1-2 episodes of waking up in the night with pain along the incision of the left wrist but it is short acting and she is able to fall back asleep.    She would like to have the right upper extremity carpal tunnel and cubital tunnel release but wants to wait until the last of 2025 as she will be finishing up with school.                                                 Past Medical History:   Diagnosis Date    Hepatic fibrosis     see note [facility] -    Hepatitis C     history of; followed by GI    History of substance use     methamphetamine - last use     Wears partial dentures     upper only        Past Surgical History:   Procedure Laterality Date    CARPAL TUNNEL RELEASE WITH CUBITAL TUNNEL RELEASE Left 2025    Procedure: Left carpal tunnel and cubital tunnel release;  Surgeon: Chema Capellan MD;  Location: [facility];  Service: Orthopedics;  Laterality: Left;    NO PAST SURGERIES         Family History   Problem Relation Age of Onset    Seizures Mother     Dementia Maternal Grandmother        Social History     Socioeconomic History    Marital status: Single   Tobacco Use    Smoking status: Former     Current packs/day: 0.00     Types: Cigarettes     Quit date: 2024     Years since quittin.2     Passive exposure: Never    Smokeless tobacco: Never   Vaping Use    Vaping status: Every Day    Substances: Nicotine    Devices: Disposable   Substance and Sexual Activity    Alcohol use: Not Currently    Drug use: Not Currently     Types:  "Methamphetamines     Comment: last use  since 2023; currently on suboxone    Sexual activity: Defer         Current Outpatient Medications:     baclofen (LIORESAL) 10 MG tablet, , Disp: , Rfl:     valACYclovir (VALTREX) 500 MG tablet, , Disp: , Rfl:     buprenorphine-naloxone (SUBOXONE) 8-2 MG film film, Place 2 films under the tongue Daily., Disp: , Rfl:     celecoxib (CeleBREX) 200 MG capsule, Take 1 capsule by mouth twice daily for 2 days, then only take 1 capsule once daily as needed for pain (Patient not taking: Reported on 4/25/2025), Disp: 14 capsule, Rfl: 0    gabapentin (NEURONTIN) 300 MG capsule, Take 1 capsule by mouth 2 (Two) Times a Day., Disp: 60 capsule, Rfl: 5    No Known Allergies    Review of Systems  See HPI    I have reviewed the medical and surgical history, family history, social history, medications, and/or allergies, and the review of systems of this report.    Objective   BP 96/70   Ht 149.9 cm (59\")   Wt 48.4 kg (106 lb 12.8 oz)   BMI 21.57 kg/m²    Physical Exam  Vitals and nursing note reviewed.   Constitutional:       Appearance: Normal appearance.   Pulmonary:      Effort: Pulmonary effort is normal.   Neurological:      Mental Status: She is alert and oriented to person, place, and time.       Right Hand Exam     Muscle Strength   Wrist extension: 4/5   Wrist flexion: 4/5   : 4/5     Tests   Tinel's sign (median nerve): positive      Left Hand Exam     Muscle Strength   Wrist extension: 4/5   Wrist flexion: 4/5   :  4/5     Tests   Tinel's sign (median nerve): negative      Right Elbow Exam     Tests   Varus: negative  Valgus: negative  Tinel's sign (cubital tunnel): positive    Other   Pulse: present      Left Elbow Exam     Tests   Varus: negative  Valgus: negative  Tinel's sign (cubital tunnel): negative    Other   Pulse: present             Neurological Exam  Mental Status  Alert. Oriented to person, place, and time.    Motor                                               " Right                     Left  Wrist flexion                           4                          4  Wrist extension                      4                          4       Well-healed scar to the left carpal tunnel and cubital tunnel zone.  Patient is neurovascularly intact to the left upper extremity.      Assessment & Plan   Independent Review of Radiographic Studies:    No new imaging done today.  Remote EMG from December 2024 revealed mild bilateral cubital tunnel syndrome and moderate right carpal tunnel syndrome.    Procedures       Diagnoses and all orders for this visit:    1. S/P carpal tunnel release (Primary)    2. S/P cubital tunnel release    3. Carpal tunnel syndrome, bilateral    4. Cubital tunnel syndrome, bilateral       Discussion of orthopedic goals  Orthopedic activities reviewed and patient expressed appreciation  Risk, benefits, and merits of treatment alternatives reviewed with the patient and questions answered  The nature of the proposed surgery reviewed with the patient including risks, benefits, rehabilitation, recovery timeframe, and outcome expectations    Recommendations/Plan:  Exercise, medications, injections, other patient advice, and return appointment as noted.  Patient is encouraged to call or return for any issues or concerns.  Patient will work with her surgery coordinator to schedule a tentative date for right carpal tunnel and right cubital tunnel release.    Patient agreeable to call or return sooner for any concerns.      BMI is within normal parameters. No other follow-up for BMI required.

## 2025-06-06 ENCOUNTER — PREP FOR SURGERY (OUTPATIENT)
Dept: OTHER | Facility: HOSPITAL | Age: 40
End: 2025-06-06
Payer: COMMERCIAL

## 2025-06-06 DIAGNOSIS — G56.23 CUBITAL TUNNEL SYNDROME, BILATERAL: ICD-10-CM

## 2025-06-06 DIAGNOSIS — G56.03 CARPAL TUNNEL SYNDROME, BILATERAL: Primary | ICD-10-CM

## 2025-06-09 ENCOUNTER — HOSPITAL ENCOUNTER (OUTPATIENT)
Facility: HOSPITAL | Age: 40
Setting detail: HOSPITAL OUTPATIENT SURGERY
End: 2025-06-09
Attending: ORTHOPAEDIC SURGERY | Admitting: ORTHOPAEDIC SURGERY
Payer: COMMERCIAL

## 2025-06-09 DIAGNOSIS — G56.23 CUBITAL TUNNEL SYNDROME, BILATERAL: ICD-10-CM

## 2025-06-09 DIAGNOSIS — G56.03 CARPAL TUNNEL SYNDROME, BILATERAL: ICD-10-CM

## 2025-06-09 RX ORDER — FAMOTIDINE 10 MG/ML
20 INJECTION, SOLUTION INTRAVENOUS
OUTPATIENT
Start: 2025-06-10 | End: 2025-06-11

## 2025-06-11 ENCOUNTER — TELEPHONE (OUTPATIENT)
Dept: PSYCHIATRY | Facility: CLINIC | Age: 40
End: 2025-06-11

## 2025-06-11 ENCOUNTER — HOSPITAL ENCOUNTER (OUTPATIENT)
Dept: CARDIOLOGY | Facility: HOSPITAL | Age: 40
Discharge: HOME | End: 2025-06-11
Payer: COMMERCIAL

## 2025-06-11 ENCOUNTER — OFFICE VISIT (OUTPATIENT)
Dept: PSYCHIATRY | Facility: CLINIC | Age: 40
End: 2025-06-11
Payer: COMMERCIAL

## 2025-06-11 VITALS
HEIGHT: 59 IN | BODY MASS INDEX: 22.05 KG/M2 | WEIGHT: 109.4 LBS | HEART RATE: 82 BPM | DIASTOLIC BLOOD PRESSURE: 84 MMHG | OXYGEN SATURATION: 98 % | SYSTOLIC BLOOD PRESSURE: 110 MMHG

## 2025-06-11 DIAGNOSIS — F90.2 ADHD (ATTENTION DEFICIT HYPERACTIVITY DISORDER), COMBINED TYPE: ICD-10-CM

## 2025-06-11 DIAGNOSIS — Z79.899 MEDICATION MANAGEMENT: ICD-10-CM

## 2025-06-11 DIAGNOSIS — Z79.899 MEDICATION MANAGEMENT: Primary | ICD-10-CM

## 2025-06-11 PROCEDURE — 93005 ELECTROCARDIOGRAM TRACING: CPT

## 2025-06-11 RX ORDER — ACETAMINOPHEN 160 MG
TABLET,DISINTEGRATING ORAL
COMMUNITY
Start: 2025-06-02

## 2025-06-11 NOTE — TELEPHONE ENCOUNTER
Mavis dropped off a form she is needing filled out by her provider for the [facility]. A blank copy has been scanned into her chart and a copy has been given to Kristie Norman on 6/11/2025.
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
## 2025-06-11 NOTE — TELEPHONE ENCOUNTER
A request for all records from Mavis's previous provider was requested through fax from [facility], and was successful.

## 2025-06-12 LAB
QT INTERVAL: 398 MS
QTC INTERVAL: 403 MS

## 2025-06-17 ENCOUNTER — TELEPHONE (OUTPATIENT)
Dept: PSYCHIATRY | Facility: CLINIC | Age: 40
End: 2025-06-17
Payer: COMMERCIAL

## 2025-06-17 NOTE — TELEPHONE ENCOUNTER
Patient called in stated she came in last week seen provider, done cpt test and wants to know if results are back and if she could start medication Patient was advised urine drug screen was not back and can take up to 10 business days to result. Patient was advised once provider gets results and reviews she will have a medical assistant call her with next steps.

## 2025-06-18 LAB — DRUGS UR: NORMAL

## 2025-06-19 DIAGNOSIS — F90.2 ADHD (ATTENTION DEFICIT HYPERACTIVITY DISORDER), COMBINED TYPE: Primary | ICD-10-CM

## 2025-06-19 DIAGNOSIS — F90.2 ADHD (ATTENTION DEFICIT HYPERACTIVITY DISORDER), COMBINED TYPE: ICD-10-CM

## 2025-06-19 RX ORDER — METHYLPHENIDATE HYDROCHLORIDE 40 MG/1
1 CAPSULE ORAL NIGHTLY
Qty: 30 CAPSULE | Refills: 0 | Status: SHIPPED | OUTPATIENT
Start: 2025-06-19 | End: 2025-06-19 | Stop reason: SDUPTHER

## 2025-06-19 RX ORDER — METHYLPHENIDATE HYDROCHLORIDE 40 MG/1
1 CAPSULE ORAL NIGHTLY
Qty: 30 CAPSULE | Refills: 0 | Status: SHIPPED | OUTPATIENT
Start: 2025-06-19

## 2025-06-19 NOTE — TELEPHONE ENCOUNTER
Rx Refill Note  Requested Prescriptions     Pending Prescriptions Disp Refills    Methylphenidate HCl ER, PM, (Jornay PM) 40 MG capsule sustained-release 24 hr 30 capsule 0     Sig: Take 1 capsule by mouth Every Night.      Last office visit with prescribing clinician: 6/11/2025   Last telemedicine visit with prescribing clinician: Visit date not found   Next office visit with prescribing clinician: 7/9/2025                         Would you like a call back once the refill request has been completed: [] Yes [] No    If the office needs to give you a call back, can they leave a voicemail: [] Yes [] No    Rajeev Sanchez MA  06/19/25, 15:50 EDT

## 2025-06-19 NOTE — TELEPHONE ENCOUNTER
Pt called asking for Mj to be sent to [facility] instead of [facility]. Left VM at [facility] to cancel Rx. Please advise sending to [facility].   Updated in chart.

## 2025-06-30 ENCOUNTER — PRE-ADMISSION TESTING (OUTPATIENT)
Dept: PREADMISSION TESTING | Facility: HOSPITAL | Age: 40
End: 2025-06-30
Payer: COMMERCIAL

## 2025-06-30 ENCOUNTER — OFFICE VISIT (OUTPATIENT)
Dept: ORTHOPEDIC SURGERY | Facility: CLINIC | Age: 40
End: 2025-06-30
Payer: COMMERCIAL

## 2025-06-30 ENCOUNTER — HOSPITAL ENCOUNTER (OUTPATIENT)
Dept: GENERAL RADIOLOGY | Facility: HOSPITAL | Age: 40
Discharge: HOME | End: 2025-06-30
Payer: COMMERCIAL

## 2025-06-30 VITALS — WEIGHT: 103.4 LBS | HEIGHT: 59 IN | BODY MASS INDEX: 20.84 KG/M2

## 2025-06-30 VITALS
HEIGHT: 59 IN | BODY MASS INDEX: 20.76 KG/M2 | SYSTOLIC BLOOD PRESSURE: 100 MMHG | DIASTOLIC BLOOD PRESSURE: 70 MMHG | WEIGHT: 103 LBS

## 2025-06-30 DIAGNOSIS — G56.03 CARPAL TUNNEL SYNDROME, BILATERAL: ICD-10-CM

## 2025-06-30 DIAGNOSIS — M25.532 PAIN OF BOTH WRIST JOINTS: ICD-10-CM

## 2025-06-30 DIAGNOSIS — G56.23 CUBITAL TUNNEL SYNDROME, BILATERAL: ICD-10-CM

## 2025-06-30 DIAGNOSIS — G56.03 CARPAL TUNNEL SYNDROME, BILATERAL: Primary | ICD-10-CM

## 2025-06-30 DIAGNOSIS — M25.531 PAIN OF BOTH WRIST JOINTS: ICD-10-CM

## 2025-06-30 LAB
ALBUMIN SERPL-MCNC: 4.2 G/DL (ref 3.5–5.2)
ALBUMIN/GLOB SERPL: 1.1 G/DL
ALP SERPL-CCNC: 83 U/L (ref 39–117)
ALT SERPL W P-5'-P-CCNC: 10 U/L (ref 1–33)
ANION GAP SERPL CALCULATED.3IONS-SCNC: 11.9 MMOL/L (ref 5–15)
AST SERPL-CCNC: 19 U/L (ref 1–32)
BASOPHILS # BLD AUTO: 0.01 10*3/MM3 (ref 0–0.2)
BASOPHILS NFR BLD AUTO: 0.3 % (ref 0–1.5)
BILIRUB SERPL-MCNC: 0.2 MG/DL (ref 0–1.2)
BILIRUB UR QL STRIP: NEGATIVE
BUN SERPL-MCNC: 8 MG/DL (ref 6–20)
BUN/CREAT SERPL: 11.8 (ref 7–25)
CALCIUM SPEC-SCNC: 9.4 MG/DL (ref 8.6–10.5)
CHLORIDE SERPL-SCNC: 103 MMOL/L (ref 98–107)
CLARITY UR: CLEAR
CO2 SERPL-SCNC: 25.1 MMOL/L (ref 22–29)
COLOR UR: YELLOW
CREAT SERPL-MCNC: 0.68 MG/DL (ref 0.57–1)
DEPRECATED RDW RBC AUTO: 38.3 FL (ref 37–54)
EGFRCR SERPLBLD CKD-EPI 2021: 113.1 ML/MIN/1.73
EOSINOPHIL # BLD AUTO: 0.08 10*3/MM3 (ref 0–0.4)
EOSINOPHIL NFR BLD AUTO: 2.2 % (ref 0.3–6.2)
ERYTHROCYTE [DISTWIDTH] IN BLOOD BY AUTOMATED COUNT: 11.9 % (ref 12.3–15.4)
GLOBULIN UR ELPH-MCNC: 3.7 GM/DL
GLUCOSE SERPL-MCNC: 74 MG/DL (ref 65–99)
GLUCOSE UR STRIP-MCNC: NEGATIVE MG/DL
HCT VFR BLD AUTO: 35.2 % (ref 34–46.6)
HGB BLD-MCNC: 11.9 G/DL (ref 12–15.9)
HGB UR QL STRIP.AUTO: NEGATIVE
IMM GRANULOCYTES # BLD AUTO: 0 10*3/MM3 (ref 0–0.05)
IMM GRANULOCYTES NFR BLD AUTO: 0 % (ref 0–0.5)
KETONES UR QL STRIP: NEGATIVE
LEUKOCYTE ESTERASE UR QL STRIP.AUTO: NEGATIVE
LYMPHOCYTES # BLD AUTO: 1.14 10*3/MM3 (ref 0.7–3.1)
LYMPHOCYTES NFR BLD AUTO: 32 % (ref 19.6–45.3)
MCH RBC QN AUTO: 29.7 PG (ref 26.6–33)
MCHC RBC AUTO-ENTMCNC: 33.8 G/DL (ref 31.5–35.7)
MCV RBC AUTO: 87.8 FL (ref 79–97)
MONOCYTES # BLD AUTO: 0.33 10*3/MM3 (ref 0.1–0.9)
MONOCYTES NFR BLD AUTO: 9.3 % (ref 5–12)
NEUTROPHILS NFR BLD AUTO: 2 10*3/MM3 (ref 1.7–7)
NEUTROPHILS NFR BLD AUTO: 56.2 % (ref 42.7–76)
NITRITE UR QL STRIP: NEGATIVE
NRBC BLD AUTO-RTO: 0 /100 WBC (ref 0–0.2)
PH UR STRIP.AUTO: 6 [PH] (ref 5–8)
PLATELET # BLD AUTO: 225 10*3/MM3 (ref 140–450)
PMV BLD AUTO: 9.9 FL (ref 6–12)
POTASSIUM SERPL-SCNC: 4.4 MMOL/L (ref 3.5–5.2)
PROT SERPL-MCNC: 7.9 G/DL (ref 6–8.5)
PROT UR QL STRIP: NEGATIVE
RBC # BLD AUTO: 4.01 10*6/MM3 (ref 3.77–5.28)
SODIUM SERPL-SCNC: 140 MMOL/L (ref 136–145)
SP GR UR STRIP: 1.02 (ref 1–1.03)
UROBILINOGEN UR QL STRIP: NORMAL
WBC NRBC COR # BLD AUTO: 3.56 10*3/MM3 (ref 3.4–10.8)

## 2025-06-30 PROCEDURE — 1160F RVW MEDS BY RX/DR IN RCRD: CPT | Performed by: PHYSICIAN ASSISTANT

## 2025-06-30 PROCEDURE — 87081 CULTURE SCREEN ONLY: CPT

## 2025-06-30 PROCEDURE — S0260 H&P FOR SURGERY: HCPCS | Performed by: PHYSICIAN ASSISTANT

## 2025-06-30 PROCEDURE — 1159F MED LIST DOCD IN RCRD: CPT | Performed by: PHYSICIAN ASSISTANT

## 2025-06-30 PROCEDURE — 81003 URINALYSIS AUTO W/O SCOPE: CPT

## 2025-06-30 PROCEDURE — 36415 COLL VENOUS BLD VENIPUNCTURE: CPT

## 2025-06-30 PROCEDURE — 71046 X-RAY EXAM CHEST 2 VIEWS: CPT

## 2025-06-30 PROCEDURE — 85025 COMPLETE CBC W/AUTO DIFF WBC: CPT

## 2025-06-30 PROCEDURE — 80053 COMPREHEN METABOLIC PANEL: CPT

## 2025-06-30 RX ORDER — IBUPROFEN 800 MG/1
800 TABLET, FILM COATED ORAL 3 TIMES DAILY
Qty: 60 TABLET | Refills: 0 | Status: SHIPPED | OUTPATIENT
Start: 2025-06-30

## 2025-06-30 NOTE — PROGRESS NOTES
Mavis Byrd is a 40 y.o. female   Pre-op Exam of the Right Wrist (Right carpal and cubital tunnel release scheduled for 7/10/25) and Pre-op Exam of the Right Elbow         CHIEF COMPLAINT:    Pre-operative evaluation with history and physical exam    History of Present Illness      This is a chronic condition.  Patient presents for a preoperative visit for a planned right carpal tunnel and right cubital tunnel release.  Patient had a recent EMG of the right upper extremity that did reveal moderate carpal tunnel syndrome as well as cubital tunnel syndrome.  She has had great success with contralateral left carpal tunnel release and would like to proceed with a right side carpal tunnel release with cubital tunnel release.    No recent illness or infection.  Patient request ibuprofen as her postop pain medication.  PAST MEDICAL HISTORY:    Past Medical History:   Diagnosis Date    ADHD (attention deficit hyperactivity disorder)     Depression     Hepatic fibrosis 2023    see note [facility] -    Hepatitis C 2023    history of; followed by GI    History of substance use     methamphetamine - last use 2023    PTSD (post-traumatic stress disorder)     Substance abuse     Wears partial dentures     upper only         Current Outpatient Medications:     baclofen (LIORESAL) 10 MG tablet, , Disp: , Rfl:     buprenorphine-naloxone (SUBOXONE) 8-2 MG film film, Place 2 films under the tongue Daily., Disp: , Rfl:     Cholecalciferol (Vitamin D3) 50 MCG (2000 UT) capsule, , Disp: , Rfl:     gabapentin (NEURONTIN) 300 MG capsule, Take 1 capsule by mouth 2 (Two) Times a Day., Disp: 60 capsule, Rfl: 5    ibuprofen (ADVIL,MOTRIN) 800 MG tablet, Take 1 tablet by mouth 3 (Three) Times a Day., Disp: 60 tablet, Rfl: 0    Methylphenidate HCl ER, PM, (Jornay PM) 40 MG capsule sustained-release 24 hr, Take 1 capsule by mouth Every Night., Disp: 30 capsule, Rfl: 0    valACYclovir (VALTREX) 500 MG tablet, , Disp: , Rfl:     Past Surgical History:  "  Procedure Laterality Date    CARPAL TUNNEL RELEASE WITH CUBITAL TUNNEL RELEASE Left 2025    Procedure: Left carpal tunnel and cubital tunnel release;  Surgeon: Chema Capellan MD;  Location: [facility];  Service: Orthopedics;  Laterality: Left;    NO PAST SURGERIES         Family History   Problem Relation Age of Onset    Seizures Mother     Dementia Maternal Grandmother        Social History     Socioeconomic History    Marital status: Single   Tobacco Use    Smoking status: Former     Current packs/day: 0.00     Types: Cigarettes     Quit date: 2024     Years since quittin.3     Passive exposure: Past    Smokeless tobacco: Never   Vaping Use    Vaping status: Every Day    Substances: Nicotine, Flavoring    Devices: Disposable    Passive vaping exposure: Yes   Substance and Sexual Activity    Alcohol use: Not Currently    Drug use: Not Currently     Types: Methamphetamines     Comment: Clean since ; currently on suboxone per Pt. (25)    Sexual activity: Defer        No Known Allergies    Review of Systems   Constitutional:  Negative for chills and fever.   Musculoskeletal:  Positive for arthralgias (right arm, right wrist).   Neurological:  Positive for numbness (right hand).       I have reviewed the medical and surgical history, family history, social history, medications, and/or allergies, and the review of systems of this report.    PHYSICAL EXAMINATION:       /70   Ht 149.9 cm (59\")   Wt 46.7 kg (103 lb)   BMI 20.80 kg/m²     GENERAL [x] Well developed  []Ill appearing [x] No acute distress    HEENT [x]No acute changes [x] Normocephalic, atraumatic    NECK [x]Supple  [] No midline tenderness    LUNGS [x]Clear bilaterally [x]No wheezes []Rhonchi [] Rales    HEART [x] Regular rate  [x] Regular rhythm [] Irregular    ABDOMEN [x] Soft [x] Not tender [x] Not distended [x] Normal sounds    VAS/EXT [x] Normal Pulses []Edema []Cyanosis             SKIN [x] Warm [x]Dry []Pink " []Ecchymosis []Cool    NEURO [x] Motor Intact [x] Pulse intact               Physical Exam  Vitals and nursing note reviewed.   Constitutional:       Appearance: Normal appearance.   Musculoskeletal:      Right hand: Normal range of motion. Decreased sensation of the ulnar distribution and median distribution. Normal capillary refill. Normal pulse.   Neurological:      Mental Status: She is alert and oriented to person, place, and time.       Ortho Exam   Neurological Exam  Mental Status  Alert. Oriented to person, place, and time.  Positive Tinel's sign to the right elbow and right wrist.             Independent Review of Radiographic Studies:    No new imaging done today.  Reviewed imaging with patient at a prior visit.  Laboratory and Other Studies:  No new results reviewed today.           Diagnoses and all orders for this visit:    1. Carpal tunnel syndrome, bilateral (Primary)  -     ibuprofen (ADVIL,MOTRIN) 800 MG tablet; Take 1 tablet by mouth 3 (Three) Times a Day.  Dispense: 60 tablet; Refill: 0    2. Pain of both wrist joints  -     ibuprofen (ADVIL,MOTRIN) 800 MG tablet; Take 1 tablet by mouth 3 (Three) Times a Day.  Dispense: 60 tablet; Refill: 0    3. Cubital tunnel syndrome, bilateral        *SPECIAL INSTRUCTIONS:  Multi-modal analgesia.  Multi-modal DVT prophylaxis.  Rehabilitation PT/OT planned.    Assessment/Plan:  Discussion of orthopaedic goals and activities and patient expressed appreciation.  Risk, benefits, and merits of treatment alternatives reviewed with the patient and questions answered.  The nature of the proposed surgery reviewed with patient including risks, benefits, rehabilitation, recovery time, and outcome expectations.          Risks, benefits, and alternative treatments discussed with the patient: [x] Yes [] No    Risk benefits and merits of the proposed surgery were discussed and the patient's questions were answered risks discussed including and not limited to:  Anesthesia  reactions  Infection  Deep venous thrombosis and pulmonary embolus  Nerve, vascular or tendon injury  Fracture  Deformity  Stiffness  Weakness  Skin necrosis  Revision surgery or further treatment  Recurrence of problem and condition     Informed consent: [] Signed  [x] To be obtained at hospital  [] Both    Recommendations/Plan:  I did send the postoperative prescription for ibuprofen to her listed pharmacy at her request.  Exercise, medications, injections, other patient advice, and return appointment as noted.    PLANNED SURGICAL PROCEDURE: Elective right carpal tunnel and cubital tunnel release.      Patient is encouraged and agreeable to call or return sooner for any issues or concerns.

## 2025-06-30 NOTE — H&P (VIEW-ONLY)
Mavis Byrd is a 40 y.o. female   Pre-op Exam of the Right Wrist (Right carpal and cubital tunnel release scheduled for 7/10/25) and Pre-op Exam of the Right Elbow         CHIEF COMPLAINT:    Pre-operative evaluation with history and physical exam    History of Present Illness      This is a chronic condition.  Patient presents for a preoperative visit for a planned right carpal tunnel and right cubital tunnel release.  Patient had a recent EMG of the right upper extremity that did reveal moderate carpal tunnel syndrome as well as cubital tunnel syndrome.  She has had great success with contralateral left carpal tunnel release and would like to proceed with a right side carpal tunnel release with cubital tunnel release.    No recent illness or infection.  Patient request ibuprofen as her postop pain medication.  PAST MEDICAL HISTORY:    Past Medical History:   Diagnosis Date    ADHD (attention deficit hyperactivity disorder)     Depression     Hepatic fibrosis 2023    see note [facility] -    Hepatitis C 2023    history of; followed by GI    History of substance use     methamphetamine - last use 2023    PTSD (post-traumatic stress disorder)     Substance abuse     Wears partial dentures     upper only         Current Outpatient Medications:     baclofen (LIORESAL) 10 MG tablet, , Disp: , Rfl:     buprenorphine-naloxone (SUBOXONE) 8-2 MG film film, Place 2 films under the tongue Daily., Disp: , Rfl:     Cholecalciferol (Vitamin D3) 50 MCG (2000 UT) capsule, , Disp: , Rfl:     gabapentin (NEURONTIN) 300 MG capsule, Take 1 capsule by mouth 2 (Two) Times a Day., Disp: 60 capsule, Rfl: 5    ibuprofen (ADVIL,MOTRIN) 800 MG tablet, Take 1 tablet by mouth 3 (Three) Times a Day., Disp: 60 tablet, Rfl: 0    Methylphenidate HCl ER, PM, (Jornay PM) 40 MG capsule sustained-release 24 hr, Take 1 capsule by mouth Every Night., Disp: 30 capsule, Rfl: 0    valACYclovir (VALTREX) 500 MG tablet, , Disp: , Rfl:     Past Surgical History:  "  Procedure Laterality Date    CARPAL TUNNEL RELEASE WITH CUBITAL TUNNEL RELEASE Left 2025    Procedure: Left carpal tunnel and cubital tunnel release;  Surgeon: Chema Capellan MD;  Location: [facility];  Service: Orthopedics;  Laterality: Left;    NO PAST SURGERIES         Family History   Problem Relation Age of Onset    Seizures Mother     Dementia Maternal Grandmother        Social History     Socioeconomic History    Marital status: Single   Tobacco Use    Smoking status: Former     Current packs/day: 0.00     Types: Cigarettes     Quit date: 2024     Years since quittin.3     Passive exposure: Past    Smokeless tobacco: Never   Vaping Use    Vaping status: Every Day    Substances: Nicotine, Flavoring    Devices: Disposable    Passive vaping exposure: Yes   Substance and Sexual Activity    Alcohol use: Not Currently    Drug use: Not Currently     Types: Methamphetamines     Comment: Clean since ; currently on suboxone per Pt. (25)    Sexual activity: Defer        No Known Allergies    Review of Systems   Constitutional:  Negative for chills and fever.   Musculoskeletal:  Positive for arthralgias (right arm, right wrist).   Neurological:  Positive for numbness (right hand).       I have reviewed the medical and surgical history, family history, social history, medications, and/or allergies, and the review of systems of this report.    PHYSICAL EXAMINATION:       /70   Ht 149.9 cm (59\")   Wt 46.7 kg (103 lb)   BMI 20.80 kg/m²     GENERAL [x] Well developed  []Ill appearing [x] No acute distress    HEENT [x]No acute changes [x] Normocephalic, atraumatic    NECK [x]Supple  [] No midline tenderness    LUNGS [x]Clear bilaterally [x]No wheezes []Rhonchi [] Rales    HEART [x] Regular rate  [x] Regular rhythm [] Irregular    ABDOMEN [x] Soft [x] Not tender [x] Not distended [x] Normal sounds    VAS/EXT [x] Normal Pulses []Edema []Cyanosis             SKIN [x] Warm [x]Dry []Pink " []Ecchymosis []Cool    NEURO [x] Motor Intact [x] Pulse intact               Physical Exam  Vitals and nursing note reviewed.   Constitutional:       Appearance: Normal appearance.   Musculoskeletal:      Right hand: Normal range of motion. Decreased sensation of the ulnar distribution and median distribution. Normal capillary refill. Normal pulse.   Neurological:      Mental Status: She is alert and oriented to person, place, and time.       Ortho Exam   Neurological Exam  Mental Status  Alert. Oriented to person, place, and time.  Positive Tinel's sign to the right elbow and right wrist.             Independent Review of Radiographic Studies:    No new imaging done today.  Reviewed imaging with patient at a prior visit.  Laboratory and Other Studies:  No new results reviewed today.           Diagnoses and all orders for this visit:    1. Carpal tunnel syndrome, bilateral (Primary)  -     ibuprofen (ADVIL,MOTRIN) 800 MG tablet; Take 1 tablet by mouth 3 (Three) Times a Day.  Dispense: 60 tablet; Refill: 0    2. Pain of both wrist joints  -     ibuprofen (ADVIL,MOTRIN) 800 MG tablet; Take 1 tablet by mouth 3 (Three) Times a Day.  Dispense: 60 tablet; Refill: 0    3. Cubital tunnel syndrome, bilateral        *SPECIAL INSTRUCTIONS:  Multi-modal analgesia.  Multi-modal DVT prophylaxis.  Rehabilitation PT/OT planned.    Assessment/Plan:  Discussion of orthopaedic goals and activities and patient expressed appreciation.  Risk, benefits, and merits of treatment alternatives reviewed with the patient and questions answered.  The nature of the proposed surgery reviewed with patient including risks, benefits, rehabilitation, recovery time, and outcome expectations.          Risks, benefits, and alternative treatments discussed with the patient: [x] Yes [] No    Risk benefits and merits of the proposed surgery were discussed and the patient's questions were answered risks discussed including and not limited to:  Anesthesia  reactions  Infection  Deep venous thrombosis and pulmonary embolus  Nerve, vascular or tendon injury  Fracture  Deformity  Stiffness  Weakness  Skin necrosis  Revision surgery or further treatment  Recurrence of problem and condition     Informed consent: [] Signed  [x] To be obtained at hospital  [] Both    Recommendations/Plan:  I did send the postoperative prescription for ibuprofen to her listed pharmacy at her request.  Exercise, medications, injections, other patient advice, and return appointment as noted.    PLANNED SURGICAL PROCEDURE: Elective right carpal tunnel and cubital tunnel release.      Patient is encouraged and agreeable to call or return sooner for any issues or concerns.

## 2025-07-01 LAB — MRSA SPEC QL CULT: NORMAL

## 2025-07-02 ENCOUNTER — HOSPITAL ENCOUNTER (OUTPATIENT)
Dept: ULTRASOUND IMAGING | Facility: HOSPITAL | Age: 40
Discharge: HOME | End: 2025-07-02
Admitting: NURSE PRACTITIONER
Payer: COMMERCIAL

## 2025-07-02 DIAGNOSIS — K74.02 ADVANCED HEPATIC FIBROSIS: ICD-10-CM

## 2025-07-02 PROCEDURE — 76700 US EXAM ABDOM COMPLETE: CPT

## 2025-07-08 ENCOUNTER — TELEPHONE (OUTPATIENT)
Dept: PSYCHIATRY | Facility: CLINIC | Age: 40
End: 2025-07-08
Payer: COMMERCIAL

## 2025-07-09 ENCOUNTER — OFFICE VISIT (OUTPATIENT)
Dept: PSYCHIATRY | Facility: CLINIC | Age: 40
End: 2025-07-09
Payer: COMMERCIAL

## 2025-07-09 VITALS
OXYGEN SATURATION: 96 % | SYSTOLIC BLOOD PRESSURE: 124 MMHG | DIASTOLIC BLOOD PRESSURE: 76 MMHG | HEIGHT: 59 IN | BODY MASS INDEX: 20.88 KG/M2 | HEART RATE: 85 BPM

## 2025-07-09 DIAGNOSIS — F90.2 ADHD (ATTENTION DEFICIT HYPERACTIVITY DISORDER), COMBINED TYPE: Primary | ICD-10-CM

## 2025-07-09 RX ORDER — METHYLPHENIDATE HYDROCHLORIDE 80 MG/1
1 CAPSULE ORAL NIGHTLY
Qty: 30 CAPSULE | Refills: 0 | Status: SHIPPED | OUTPATIENT
Start: 2025-07-09

## 2025-07-09 NOTE — PROGRESS NOTES
Follow Up Office Visit      Patient Name: Mavis Byrd  : 1985   MRN: [id number]     Referring Provider: Vanessa Galeana APRN    Chief Complaint:      ICD-10-CM ICD-9-CM   1. ADHD (attention deficit hyperactivity disorder), combined type  F90.2 314.01        History of Present Illness:   Mavis Byrd is a 40 y.o. female who is here today for follow up after last being seen on 2025.  At that time Jornay was started.  Patient has tolerated with no adverse effects.  History of Present Illness  The patient presents for ADHD.    She is currently enrolled in an online program at [employer]. She recently failed a test due to time constraints, not because she did not know the answers. She is unsure if she can retake the test and is awaiting her instructor's response. She reports no adverse effects from Jornay and describes her mood as stable. Her anxiety levels are manageable, and her sleep pattern remains unchanged. She has not noticed any significant improvement in focus or task initiation. She believes that the medication may need more time to take effect. She has previously tried Strattera, which was ineffective for her. She also mentions that she occasionally forgets to take her medication but ensures to take it even if it means waking up in the middle of the night.  She denies any worsening of mood, depressive symptoms katherine or hypomania.  She denies any worsening of anxiety, panic episodes, OCD or phobias.  Sleep and appetite remained stable and unchanged, no concerns at this time.  She denies any SI, HI, AVH  Interim History: She reports no adverse effects from Jornay. Mood is stable, anxiety levels are manageable, and sleep patterns remain unchanged. No significant improvement in focus or task initiation has been noticed.    Social History:  - Enrolled in an online program at [employer]  - Son assists with transportation    Psychiatric History: She has previously tried Strattera, which  "was ineffective.    Pertinent Negatives: She reports no adverse effects from Jornay.    PAST SURGICAL HISTORY: She has had carpal tunnel surgery on her left hand in the past. She is scheduled for carpal tunnel surgery on her right hand tomorrow.      Subjective     Review of Systems:   Review of Systems    Screening Scores:   PHQ-9 : 12  RYLEE-7 : 7    Medications:     Current Outpatient Medications:     baclofen (LIORESAL) 10 MG tablet, Take 1 tablet by mouth As Needed for Muscle Spasms., Disp: , Rfl:     buprenorphine-naloxone (SUBOXONE) 8-2 MG film film, Place 2 films under the tongue Daily., Disp: , Rfl:     Cholecalciferol (Vitamin D3) 50 MCG (2000 UT) capsule, Take 1 capsule by mouth Daily., Disp: , Rfl:     gabapentin (NEURONTIN) 300 MG capsule, Take 1 capsule by mouth 2 (Two) Times a Day., Disp: 60 capsule, Rfl: 5    ibuprofen (ADVIL,MOTRIN) 800 MG tablet, Take 1 tablet by mouth 3 (Three) Times a Day., Disp: 60 tablet, Rfl: 0    valACYclovir (VALTREX) 500 MG tablet, Take 1 tablet by mouth Daily., Disp: , Rfl:     Methylphenidate HCl ER, PM, (Jornay PM) 80 MG capsule sustained-release 24 hr, Take 1 capsule by mouth Every Night., Disp: 30 capsule, Rfl: 0    Medication Considerations:  EDMOND reviewed and appropriate.      Allergies:   No Known Allergies      Objective     Physical Exam:  Vital Signs:   Vitals:    07/09/25 1258   BP: 124/76   Pulse: 85   SpO2: 96%   Height: 149.9 cm (59\")     Body mass index is 20.88 kg/m².     RISK ASSESSMENT:  Patient denies any high risk factors today.      Mental Status Exam:   MENTAL STATUS EXAM   General Appearance:  Cleanly groomed and dressed  Eye Contact:  Good eye contact  Attitude:  Cooperative  Motor Activity:  Normal gait, posture  Muscle Strength:  Normal  Speech:  Normal rate, tone, volume  Language:  Spontaneous  Mood and affect:  Normal, pleasant  Hopelessness:  Denies  Loneliness: Denies  Thought Process:  Logical and goal-directed  Associations/ Thought " Content:  No delusions  Hallucinations:  None  Suicidal Ideations:  Not present  Homicidal Ideation:  Not present  Sensorium:  Alert and clear  Orientation:  Person, place, time and situation  Attention Span/ Concentration:  Easily distracted  Fund of Knowledge:  Appropriate for age and educational level  Intellectual Functioning:  Average range  Insight:  Good  Judgement:  Good  Reliability:  Good  Impulse Control:  Good         Assessment / Plan      Visit Diagnosis/Orders Placed This Visit:  Diagnoses and all orders for this visit:    1. ADHD (attention deficit hyperactivity disorder), combined type (Primary)  -     Methylphenidate HCl ER, PM, (Jornay PM) 80 MG capsule sustained-release 24 hr; Take 1 capsule by mouth Every Night.  Dispense: 30 capsule; Refill: 0       Assessment & Plan  Problems:  - Attention deficit hyperactivity disorder (ADHD)    Content of Therapy:  During the session, the patient discussed her car troubles and the assistance provided by her son. She shared positive news about her progress in drug court, including the reduction of her probation period due to good behavior. The patient also expressed frustration over a recent school test, attributing her difficulties to ADHD and the need for accommodations. The discussion included exploring options for obtaining extra time on tests and other accommodations under the American Disabilities Act. The patient reported no significant side effects from her current medication, Jornay, but noted a lack of improvement in focus and task initiation.    Clinical Impression:  The patient continues to experience symptoms of ADHD, with no notable improvement in focus or task initiation despite current medication. She reports stable mood and manageable anxiety levels. Sleep patterns remain unchanged. The patient is receptive to increasing her medication dosage and seeking accommodations for her ADHD diagnosis.    Therapeutic Intervention:  The session included  cognitive reframing to address the patient's frustration with her school test and exploring options for accommodations under the American Disabilities Act. The clinician provided information on how to request extra time on tests and other accommodations through the school's office of disabilities.    Plan:  - Increase Jornay dosage to 80 mg.  - Advise the patient to take two 40 mg capsules until the 80 mg pill is available.  - Provide necessary documentation for dose change.  - Encourage the patient to contact the school's office of disabilities to request accommodations for ADHD.    Follow-up:  - Schedule a follow-up visit in 4 weeks.    Notes & Risk Factors:  - The patient will undergo carpal tunnel surgery tomorrow.  - No reported risk factors for harm to self or others.    Functional Status: Moderate impairment     Prognosis: Good with Ongoing Treatment     Impression/Formulation:  Patient appeared alert and oriented. Patient is receptive to assistance with maintaining a stable lifestyle.  Patient presents with history of     ICD-10-CM ICD-9-CM   1. ADHD (attention deficit hyperactivity disorder), combined type  F90.2 314.01   .     Care/ Treatment Plan:   -Increase Jornay 80 mg, 1 capsule nightly for ADHD    Patient will continue supportive psychotherapy efforts and medications as indicated. Clinic will obtain release of information for current treatment team for continuity of care as needed. Patient will contact this office, call 911 or present to the nearest emergency room should suicidal or homicidal ideations occur.  Discussed medication options and treatment plan of prescribed medication(s) as well as the risks, benefits, and potential side effects. Patient ackowledged and verbally consented to continue with current treatment plan and was educated on the importance of compliance with treatment and follow-up appointments.     Quality Measures:   Mavis CLEMENTINA Byrd  reports that she quit smoking about 16  months ago. Her smoking use included cigarettes. She has been exposed to tobacco smoke. She has never used smokeless tobacco. I have educated her on the risk of diseases from using tobacco products such as cancer, COPD, and heart disease.     Patient is a former tobacco user. No tobacco cessation education necessary.             ACP discussion was declined by the patient. Patient does not have an advance directive, declines further assistance.    Depression (PHQ >9): 12      Follow Up:   Return in about 4 weeks (around 8/6/2025).    Patient or patient representative verbalized consent for the use of Ambient Listening during the visit with  EDUARDO Jolly for chart documentation. 7/16/2025  16:52 EDT        EDUARDO Sherman, [facility]

## 2025-07-15 ENCOUNTER — TELEPHONE (OUTPATIENT)
Dept: PSYCHIATRY | Facility: CLINIC | Age: 40
End: 2025-07-15
Payer: COMMERCIAL

## 2025-07-15 NOTE — TELEPHONE ENCOUNTER
Mavis dropped off a Medical Appointment Verification form. She stated it was for Kristie to complete, and once finished to let her know. A blank copy has been scanned into media, and given to provider.

## 2025-07-16 NOTE — TELEPHONE ENCOUNTER
Form has been completed and sent to Mavis via [employer] and email. She is aware she can  a copy in office.

## 2025-07-17 ENCOUNTER — ANESTHESIA EVENT (OUTPATIENT)
Dept: PERIOP | Facility: HOSPITAL | Age: 40
End: 2025-07-17
Payer: COMMERCIAL

## 2025-07-17 ENCOUNTER — ANESTHESIA (OUTPATIENT)
Dept: PERIOP | Facility: HOSPITAL | Age: 40
End: 2025-07-17
Payer: COMMERCIAL

## 2025-07-17 VITALS
RESPIRATION RATE: 16 BRPM | HEART RATE: 66 BPM | OXYGEN SATURATION: 100 % | DIASTOLIC BLOOD PRESSURE: 84 MMHG | TEMPERATURE: 97 F | SYSTOLIC BLOOD PRESSURE: 137 MMHG

## 2025-07-17 LAB
B-HCG UR QL: NEGATIVE
EXPIRATION DATE: NORMAL
INTERNAL NEGATIVE CONTROL: NEGATIVE
INTERNAL POSITIVE CONTROL: POSITIVE
Lab: NORMAL

## 2025-07-17 PROCEDURE — 25010000002 LIDOCAINE (CARDIAC): Performed by: NURSE ANESTHETIST, CERTIFIED REGISTERED

## 2025-07-17 PROCEDURE — 25010000002 PROPOFOL 10 MG/ML EMULSION: Performed by: NURSE ANESTHETIST, CERTIFIED REGISTERED

## 2025-07-17 PROCEDURE — 64721 CARPAL TUNNEL SURGERY: CPT | Performed by: ORTHOPAEDIC SURGERY

## 2025-07-17 PROCEDURE — 25010000002 FENTANYL CITRATE (PF) 50 MCG/ML SOLUTION PREFILLED SYRINGE: Performed by: NURSE ANESTHETIST, CERTIFIED REGISTERED

## 2025-07-17 PROCEDURE — 25010000002 FAMOTIDINE (PF) 20 MG/2ML SOLUTION: Performed by: PHYSICIAN ASSISTANT

## 2025-07-17 PROCEDURE — 25810000003 LACTATED RINGERS PER 1000 ML: Performed by: NURSE ANESTHETIST, CERTIFIED REGISTERED

## 2025-07-17 PROCEDURE — 25010000002 CEFAZOLIN PER 500 MG: Performed by: PHYSICIAN ASSISTANT

## 2025-07-17 PROCEDURE — 25010000002 ONDANSETRON PER 1 MG: Performed by: NURSE ANESTHETIST, CERTIFIED REGISTERED

## 2025-07-17 PROCEDURE — 25010000002 DEXAMETHASONE PER 1 MG: Performed by: NURSE ANESTHETIST, CERTIFIED REGISTERED

## 2025-07-17 PROCEDURE — 25010000002 MIDAZOLAM PER 1MG: Performed by: NURSE ANESTHETIST, CERTIFIED REGISTERED

## 2025-07-17 PROCEDURE — 25010000002 CEFAZOLIN PER 500 MG: Performed by: ORTHOPAEDIC SURGERY

## 2025-07-17 PROCEDURE — 25010000002 LIDOCAINE 1 % SOLUTION 20 ML VIAL: Performed by: ORTHOPAEDIC SURGERY

## 2025-07-17 PROCEDURE — 81025 URINE PREGNANCY TEST: CPT | Performed by: PHYSICIAN ASSISTANT

## 2025-07-17 PROCEDURE — 64718 REVISE ULNAR NERVE AT ELBOW: CPT | Performed by: ORTHOPAEDIC SURGERY

## 2025-07-17 PROCEDURE — 25010000002 KETOROLAC TROMETHAMINE PER 15 MG: Performed by: NURSE ANESTHETIST, CERTIFIED REGISTERED

## 2025-07-17 PROCEDURE — 25010000002 ROPIVACAINE PER 1 MG: Performed by: ORTHOPAEDIC SURGERY

## 2025-07-17 RX ORDER — IPRATROPIUM BROMIDE AND ALBUTEROL SULFATE 2.5; .5 MG/3ML; MG/3ML
3 SOLUTION RESPIRATORY (INHALATION) ONCE AS NEEDED
Status: DISCONTINUED | OUTPATIENT
Start: 2025-07-17 | End: 2025-07-17 | Stop reason: HOSPADM

## 2025-07-17 RX ORDER — HYDRALAZINE HYDROCHLORIDE 20 MG/ML
5 INJECTION INTRAMUSCULAR; INTRAVENOUS
Status: DISCONTINUED | OUTPATIENT
Start: 2025-07-17 | End: 2025-07-17 | Stop reason: HOSPADM

## 2025-07-17 RX ORDER — DEXAMETHASONE SODIUM PHOSPHATE 4 MG/ML
INJECTION, SOLUTION INTRA-ARTICULAR; INTRALESIONAL; INTRAMUSCULAR; INTRAVENOUS; SOFT TISSUE AS NEEDED
Status: DISCONTINUED | OUTPATIENT
Start: 2025-07-17 | End: 2025-07-17 | Stop reason: SURG

## 2025-07-17 RX ORDER — FENTANYL CITRATE 50 UG/ML
INJECTION, SOLUTION INTRAMUSCULAR; INTRAVENOUS AS NEEDED
Status: DISCONTINUED | OUTPATIENT
Start: 2025-07-17 | End: 2025-07-17 | Stop reason: SURG

## 2025-07-17 RX ORDER — DEXAMETHASONE SODIUM PHOSPHATE 10 MG/ML
8 INJECTION, SOLUTION INTRAMUSCULAR; INTRAVENOUS ONCE AS NEEDED
Status: DISCONTINUED | OUTPATIENT
Start: 2025-07-17 | End: 2025-07-17 | Stop reason: HOSPADM

## 2025-07-17 RX ORDER — MIDAZOLAM HYDROCHLORIDE 2 MG/2ML
1 INJECTION, SOLUTION INTRAMUSCULAR; INTRAVENOUS
Status: DISCONTINUED | OUTPATIENT
Start: 2025-07-17 | End: 2025-07-17 | Stop reason: HOSPADM

## 2025-07-17 RX ORDER — PROPOFOL 10 MG/ML
VIAL (ML) INTRAVENOUS AS NEEDED
Status: DISCONTINUED | OUTPATIENT
Start: 2025-07-17 | End: 2025-07-17 | Stop reason: SURG

## 2025-07-17 RX ORDER — ONDANSETRON 2 MG/ML
4 INJECTION INTRAMUSCULAR; INTRAVENOUS ONCE AS NEEDED
Status: DISCONTINUED | OUTPATIENT
Start: 2025-07-17 | End: 2025-07-17 | Stop reason: HOSPADM

## 2025-07-17 RX ORDER — MIDAZOLAM HYDROCHLORIDE 2 MG/2ML
INJECTION, SOLUTION INTRAMUSCULAR; INTRAVENOUS AS NEEDED
Status: DISCONTINUED | OUTPATIENT
Start: 2025-07-17 | End: 2025-07-17 | Stop reason: SURG

## 2025-07-17 RX ORDER — FAMOTIDINE 10 MG/ML
20 INJECTION, SOLUTION INTRAVENOUS
Status: COMPLETED | OUTPATIENT
Start: 2025-07-17 | End: 2025-07-17

## 2025-07-17 RX ORDER — ONDANSETRON 2 MG/ML
INJECTION INTRAMUSCULAR; INTRAVENOUS AS NEEDED
Status: DISCONTINUED | OUTPATIENT
Start: 2025-07-17 | End: 2025-07-17 | Stop reason: SURG

## 2025-07-17 RX ORDER — KETAMINE HCL IN NACL, ISO-OSM 100MG/10ML
SYRINGE (ML) INJECTION AS NEEDED
Status: DISCONTINUED | OUTPATIENT
Start: 2025-07-17 | End: 2025-07-17 | Stop reason: SURG

## 2025-07-17 RX ORDER — SODIUM CHLORIDE, SODIUM LACTATE, POTASSIUM CHLORIDE, CALCIUM CHLORIDE 600; 310; 30; 20 MG/100ML; MG/100ML; MG/100ML; MG/100ML
INJECTION, SOLUTION INTRAVENOUS CONTINUOUS PRN
Status: DISCONTINUED | OUTPATIENT
Start: 2025-07-17 | End: 2025-07-17 | Stop reason: SURG

## 2025-07-17 RX ORDER — KETOROLAC TROMETHAMINE 30 MG/ML
INJECTION, SOLUTION INTRAMUSCULAR; INTRAVENOUS AS NEEDED
Status: DISCONTINUED | OUTPATIENT
Start: 2025-07-17 | End: 2025-07-17 | Stop reason: SURG

## 2025-07-17 RX ORDER — LABETALOL HYDROCHLORIDE 5 MG/ML
5 INJECTION, SOLUTION INTRAVENOUS
Status: DISCONTINUED | OUTPATIENT
Start: 2025-07-17 | End: 2025-07-17 | Stop reason: HOSPADM

## 2025-07-17 RX ORDER — MEPERIDINE HYDROCHLORIDE 25 MG/ML
12.5 INJECTION INTRAMUSCULAR; INTRAVENOUS; SUBCUTANEOUS
Status: DISCONTINUED | OUTPATIENT
Start: 2025-07-17 | End: 2025-07-17 | Stop reason: HOSPADM

## 2025-07-17 RX ADMIN — DEXAMETHASONE SODIUM PHOSPHATE 8 MG: 4 INJECTION, SOLUTION INTRA-ARTICULAR; INTRALESIONAL; INTRAMUSCULAR; INTRAVENOUS; SOFT TISSUE at 12:25

## 2025-07-17 RX ADMIN — PROPOFOL 200 MG: 10 INJECTION, EMULSION INTRAVENOUS at 12:25

## 2025-07-17 RX ADMIN — SODIUM CHLORIDE, POTASSIUM CHLORIDE, SODIUM LACTATE AND CALCIUM CHLORIDE: 600; 310; 30; 20 INJECTION, SOLUTION INTRAVENOUS at 11:53

## 2025-07-17 RX ADMIN — FENTANYL CITRATE 50 MCG: 50 INJECTION, SOLUTION INTRAMUSCULAR; INTRAVENOUS at 12:25

## 2025-07-17 RX ADMIN — LIDOCAINE HYDROCHLORIDE 60 MG: 20 INJECTION, SOLUTION INTRAVENOUS at 12:25

## 2025-07-17 RX ADMIN — Medication 10 MG: at 12:25

## 2025-07-17 RX ADMIN — FAMOTIDINE 20 MG: 10 INJECTION, SOLUTION INTRAVENOUS at 10:19

## 2025-07-17 RX ADMIN — SODIUM CHLORIDE, SODIUM LACTATE, POTASSIUM CHLORIDE, CALCIUM CHLORIDE: 600; 310; 30; 20 INJECTION, SOLUTION INTRAVENOUS at 12:15

## 2025-07-17 RX ADMIN — SODIUM CHLORIDE 2 G: 9 INJECTION, SOLUTION INTRAVENOUS at 12:05

## 2025-07-17 RX ADMIN — MIDAZOLAM HYDROCHLORIDE 2 MG: 1 INJECTION, SOLUTION INTRAMUSCULAR; INTRAVENOUS at 12:25

## 2025-07-17 RX ADMIN — ONDANSETRON 4 MG: 2 INJECTION INTRAMUSCULAR; INTRAVENOUS at 12:25

## 2025-07-17 RX ADMIN — KETOROLAC TROMETHAMINE 15 MG: 30 INJECTION, SOLUTION INTRAMUSCULAR at 13:34

## 2025-07-17 NOTE — ANESTHESIA PROCEDURE NOTES
Airway  Reason: elective    Date/Time: 7/17/2025 12:25 PM  Airway not difficult    General Information and Staff    Patient location during procedure: OR  CRNA/CAA: Shanelle Tucker CRNA    Indications and Patient Condition  Indications for airway management: airway protection    Preoxygenated: yes  MILS maintained throughout    Mask difficulty assessment: 1 - vent by mask    Final Airway Details    Final airway type: supraglottic airway      Successful airway: classic  Size: 4   Number of attempts at approach: 1  Assessment: lips, teeth, and gum same as pre-op and atraumatic intubation    Additional Comments  AOI X 1 PASS +BBS, +ETCO2, +R//F/C MOUTH TEETH GUMS SAME AS PREOP

## 2025-07-17 NOTE — ANESTHESIA POSTPROCEDURE EVALUATION
Patient: Mavis Byrd    Procedure Summary       Date: 07/17/25 Room / Location: [facility] OR  / [facility] OR    Anesthesia Start: 1215 Anesthesia Stop: 1400    Procedure: RIGHT CARPAL TUNNEL RELEASE WITH CUBITAL TUNNEL RELEASE (Right: Wrist) Diagnosis:       Carpal tunnel syndrome, bilateral      Cubital tunnel syndrome, bilateral      (Carpal tunnel syndrome, bilateral [G56.03])      (Cubital tunnel syndrome, bilateral [G56.23])    Surgeons: Chema Capellan MD Provider: Shanelle Tucker CRNA    Anesthesia Type: general ASA Status: 3            Anesthesia Type: general    Vitals  Vitals Value Taken Time   /82 07/17/25 14:35   Temp 97.3 °F (36.3 °C) 07/17/25 14:25   Pulse 49 07/17/25 14:36   Resp 12 07/17/25 14:25   SpO2 98 % 07/17/25 14:36   Vitals shown include unfiled device data.        Post Anesthesia Care and Evaluation    Patient location during evaluation: PACU  Level of consciousness: sleepy but conscious  Pain score: 0  Pain management: satisfactory to patient    Airway patency: patent  Anesthetic complications: No anesthetic complications  PONV Status: none  Cardiovascular status: acceptable and stable  Respiratory status: acceptable  Hydration status: acceptable    Comments: Vitals signs as noted in nursing documentation as per protocol.

## 2025-07-17 NOTE — INTERVAL H&P NOTE
H&P reviewed. The patient was examined and there are no changes to the H&P.    Vitals:    07/17/25 0959   BP: 108/67   Pulse: 77   Resp: 14   Temp: 98.2 °F (36.8 °C)   SpO2: 100%       Chema Capellan MD  7/17/2025  10:13 EDT

## 2025-07-17 NOTE — OP NOTE
[address], [address]  [place]  [address] [phone]      OPERATIVE REPORT           PATIENT NAME:   Mavis Byrd                            YOB: 1985      PREOP DIAGNOSIS:   Right Carpal Tunnel Syndrome and Cubital Tunnel Syndrome.    POSTOP DIAGNOSIS:  Right Carpal Tunnel Syndrome and Cubital Tunnel Syndrome.    PROCEDURE:     Right Carpal Tunnel Release and Cubital Tunnel Release With Anterior Transposition of the Ulna Nerve.    SURGEON:    Chema Capellan MD    OPERATIVE TEAM:   Circulator: Roland Arguello RN  Scrub Person: Mihaela Cesar Jessica    ANESTHETIST:   ARETHA: Shanelle Tucker CRNA    ANESTHESIA:   General plus local    ESTIM BLOOD LOSS:  10 ml    TOURNIQUET TIME:   33 minutes @ 250 mm Hg    SPECIMENS:     None.    DRAINS:    None.    COMPLICATIONS:     None.    DISPOSITION:     Stable to recovery.    INDICATIONS AND NARRATIVE:    The patient presents for planned elective carpal tunnel release and cubital tunnel release surgery to address the ongoing elbow, wrist and hand condition.  Risks and benefits of the surgery were discussed and an informed consent obtained.  Risks were discussed including, but not limited to anesthesia, infection, nerve/vessel/tendon injury and persistent symptoms or limitations of the wrist and hand.  Goals include pain relief, improved sensation, strength, mobility and potential for improved function of the elbow, wrist and hand.       OPERATIVE PROCEDURE:  Antibiotic prophylaxis given.  Surgeon site marking and a time out were performed.  Anesthesia was effective and well tolerated.  The arm and hand was prepped and draped in the usual sterile fashion.       After sterile prep and drape and with tourniquet, a 3 cm curved incision was made along the ulna border of the thenar crease of the hand.  Careful soft tissue dissection and blunt Ragnell retractors were used to protect the soft tissues and  expose the transverse carpal tunnel ligament.  While keeping a blunt freer under the ligament, the ligament was released with a 15 blade scalpel and a tenotomy scissors both proximally and distally, decompressing the median nerve and flexor tendons.  These structures could be easily visualized, and were intact within the carpal tunnel.  The wound was irrigated copiously with antibiotic solution.       Next, a 7 cm curved incision was made and centered posterior to the medial epicondlye. Branches of the medial antebrachial cutaneous nerve were identified and preserved during the subcutaneous dissection. The fascia over the flexor carpi ulnaris and Agapito’s ligament were released exposing the ulnar nerve posterior to the medial epicondlye within the cubital tunnel. The nerve was release distally for four centimeters and proximally as well for six to eight centimeters along the medial intermuscular septum and releasing the [place] proximally. The nerve was now in a more relaxed position throughout full elbow extension, flexion, pronation and supination. With elbow flexion greater than 90 degrees the nerve would move to a perched position on the medial epicondyle bone prominence and it was warranted to do an anterior subcutaneous transposition of the ulna nerve at the elbow.      A vessel loop was placed around the ulna nerve and the nerve was gently released additionally from the base of the cubital tunnel and easily transposed anterior to the medial epicondyle.  In this position the nerve remained rested and relaxed with no tension or translation through the full range of elbow flexion, extension, pronation and supination motions.  A fascial flap of the flexor pronator origin could be fashioned to permit the nerve to sit loosely and stay anterior to the medial epicondyle. 2-0 Vicryl suture was used to loosely approximate the fascial layer to the subcutaneous tissue superficial to the transposed ulnar  nerve, also allowing the nerve to situate superficial to the muscle for circulation and nourishment of the nerve. A freer could easily be passed along and beside the nerve in the new position as the suture approximation of the adjacent tissue placed no pressure on the nerve. Again the nerve was noted to be relaxed through full range of motion. After releasing the tourniquet, minor bleeding at the elbow and wrist incisions was addressed with cautery and the areas were irrigated copiously throughout the procedure with antibiotic solution.    With the tourniquet down there was good hemostasis.  Routine closure consisted of interrupted 3-0 nylon mattress sutures at the wrist, and interrupted 2-0 Vicryl subcutaneous sutures and steri-strips at the elbow. Sterile xeroform, padded gauze and Tegaderm dressings was applied at the wrist and the elbow.  Anesthesia was effective and well tolerated. There were no complications of the procedure. The patient was transferred in stable condition to the recovery room.

## 2025-07-17 NOTE — ANESTHESIA PREPROCEDURE EVALUATION
Anesthesia Evaluation     Patient summary reviewed and Nursing notes reviewed   no history of anesthetic complications:   NPO Solid Status: > 8 hours  NPO Liquid Status: > 4 hours           Airway   Mallampati: II  TM distance: >3 FB  Neck ROM: full  No difficulty expected  Dental    (+) upper dentures and partials    Pulmonary - normal exam   (+) a smoker (quit 3/01/2024) Former, Abstained day of surgery, cigarettes,  Cardiovascular - negative cardio ROS and normal exam    ECG reviewed  Rhythm: regular  Rate: normal      ROS comment: Test Reason : medication management  Blood Pressure :   */*   mmHG  Vent. Rate :  62 BPM     Atrial Rate :  62 BPM     P-R Int : 148 ms          QRS Dur :  78 ms      QT Int : 398 ms       P-R-T Axes :  54  54  62 degrees    QTcB Int : 403 ms     Normal sinus rhythm  Normal ECG  When compared with ECG of 10-Feb-2025 10:47,  No significant change was found  Confirmed by HAZEL EGAN ([id number]) on 6/12/2025 10:21:35 AM     Referred By: MATIAS           Confirmed By: HAZEL EGAN          Neuro/Psych  (+) numbness, psychiatric history ADHD, Depression and PTSD  GI/Hepatic/Renal/Endo    (+) hepatitis C, liver disease (hepatic fibrosis)    Musculoskeletal         ROS comment: Carpal tunnel syndrome, bilateral  Cubital tunnel syndrome, bilateral      Abdominal  - normal exam   Substance History   (+) drug use (last meth use 2023)     OB/GYN          Other        ROS/Med Hx Other: On suboxone, LD                 Anesthesia Plan    ASA 3     general     (Risks and benefits discussed including risk of aspiration, recall and dental damage. All patient questions answered.    Will continue with plan of care.)  intravenous induction     Anesthetic plan, risks, benefits, and alternatives have been provided, discussed and informed consent has been obtained with: patient.  Pre-procedure education provided    CODE STATUS:

## 2025-07-26 ENCOUNTER — APPOINTMENT (OUTPATIENT)
Dept: GENERAL RADIOLOGY | Facility: HOSPITAL | Age: 40
End: 2025-07-26
Payer: COMMERCIAL

## 2025-07-26 ENCOUNTER — HOSPITAL ENCOUNTER (EMERGENCY)
Facility: HOSPITAL | Age: 40
Discharge: HOME | End: 2025-07-26
Attending: STUDENT IN AN ORGANIZED HEALTH CARE EDUCATION/TRAINING PROGRAM
Payer: COMMERCIAL

## 2025-07-26 VITALS
HEART RATE: 86 BPM | SYSTOLIC BLOOD PRESSURE: 113 MMHG | OXYGEN SATURATION: 99 % | RESPIRATION RATE: 16 BRPM | HEIGHT: 59 IN | DIASTOLIC BLOOD PRESSURE: 68 MMHG | TEMPERATURE: 98 F | WEIGHT: 106 LBS | BODY MASS INDEX: 21.37 KG/M2

## 2025-07-26 DIAGNOSIS — M25.521 RIGHT ELBOW PAIN: Primary | ICD-10-CM

## 2025-07-26 DIAGNOSIS — G89.18 POSTOPERATIVE PAIN: ICD-10-CM

## 2025-07-26 LAB
ALBUMIN SERPL-MCNC: 4.1 G/DL (ref 3.5–5.2)
ALBUMIN/GLOB SERPL: 1.2 G/DL
ALP SERPL-CCNC: 79 U/L (ref 39–117)
ALT SERPL W P-5'-P-CCNC: 9 U/L (ref 1–33)
ANION GAP SERPL CALCULATED.3IONS-SCNC: 13.3 MMOL/L (ref 5–15)
AST SERPL-CCNC: 20 U/L (ref 1–32)
BASOPHILS # BLD AUTO: 0.01 10*3/MM3 (ref 0–0.2)
BASOPHILS NFR BLD AUTO: 0.3 % (ref 0–1.5)
BILIRUB SERPL-MCNC: 0.3 MG/DL (ref 0–1.2)
BUN SERPL-MCNC: 14 MG/DL (ref 6–20)
BUN/CREAT SERPL: 18.9 (ref 7–25)
CALCIUM SPEC-SCNC: 8.8 MG/DL (ref 8.6–10.5)
CHLORIDE SERPL-SCNC: 104 MMOL/L (ref 98–107)
CO2 SERPL-SCNC: 21.7 MMOL/L (ref 22–29)
CREAT SERPL-MCNC: 0.74 MG/DL (ref 0.57–1)
DEPRECATED RDW RBC AUTO: 36.7 FL (ref 37–54)
EGFRCR SERPLBLD CKD-EPI 2021: 105 ML/MIN/1.73
EOSINOPHIL # BLD AUTO: 0.07 10*3/MM3 (ref 0–0.4)
EOSINOPHIL NFR BLD AUTO: 1.8 % (ref 0.3–6.2)
ERYTHROCYTE [DISTWIDTH] IN BLOOD BY AUTOMATED COUNT: 11.6 % (ref 12.3–15.4)
GLOBULIN UR ELPH-MCNC: 3.3 GM/DL
GLUCOSE SERPL-MCNC: 76 MG/DL (ref 65–99)
HCT VFR BLD AUTO: 33 % (ref 34–46.6)
HGB BLD-MCNC: 11.3 G/DL (ref 12–15.9)
IMM GRANULOCYTES # BLD AUTO: 0.01 10*3/MM3 (ref 0–0.05)
IMM GRANULOCYTES NFR BLD AUTO: 0.3 % (ref 0–0.5)
LYMPHOCYTES # BLD AUTO: 1.19 10*3/MM3 (ref 0.7–3.1)
LYMPHOCYTES NFR BLD AUTO: 29.8 % (ref 19.6–45.3)
MCH RBC QN AUTO: 29.7 PG (ref 26.6–33)
MCHC RBC AUTO-ENTMCNC: 34.2 G/DL (ref 31.5–35.7)
MCV RBC AUTO: 86.6 FL (ref 79–97)
MONOCYTES # BLD AUTO: 0.38 10*3/MM3 (ref 0.1–0.9)
MONOCYTES NFR BLD AUTO: 9.5 % (ref 5–12)
NEUTROPHILS NFR BLD AUTO: 2.34 10*3/MM3 (ref 1.7–7)
NEUTROPHILS NFR BLD AUTO: 58.3 % (ref 42.7–76)
NRBC BLD AUTO-RTO: 0 /100 WBC (ref 0–0.2)
PLATELET # BLD AUTO: 197 10*3/MM3 (ref 140–450)
PMV BLD AUTO: 9.8 FL (ref 6–12)
POTASSIUM SERPL-SCNC: 3.8 MMOL/L (ref 3.5–5.2)
PROT SERPL-MCNC: 7.4 G/DL (ref 6–8.5)
RBC # BLD AUTO: 3.81 10*6/MM3 (ref 3.77–5.28)
SODIUM SERPL-SCNC: 139 MMOL/L (ref 136–145)
WBC NRBC COR # BLD AUTO: 4 10*3/MM3 (ref 3.4–10.8)

## 2025-07-26 PROCEDURE — 99283 EMERGENCY DEPT VISIT LOW MDM: CPT | Performed by: STUDENT IN AN ORGANIZED HEALTH CARE EDUCATION/TRAINING PROGRAM

## 2025-07-26 PROCEDURE — 80053 COMPREHEN METABOLIC PANEL: CPT | Performed by: NURSE PRACTITIONER

## 2025-07-26 PROCEDURE — 73080 X-RAY EXAM OF ELBOW: CPT

## 2025-07-26 PROCEDURE — 25010000002 METHYLPREDNISOLONE PER 125 MG: Performed by: NURSE PRACTITIONER

## 2025-07-26 PROCEDURE — 96374 THER/PROPH/DIAG INJ IV PUSH: CPT

## 2025-07-26 PROCEDURE — 85025 COMPLETE CBC W/AUTO DIFF WBC: CPT | Performed by: NURSE PRACTITIONER

## 2025-07-26 RX ORDER — KETOROLAC TROMETHAMINE 30 MG/ML
30 INJECTION, SOLUTION INTRAMUSCULAR; INTRAVENOUS ONCE
Status: DISCONTINUED | OUTPATIENT
Start: 2025-07-26 | End: 2025-07-26

## 2025-07-26 RX ORDER — SODIUM CHLORIDE 0.9 % (FLUSH) 0.9 %
10 SYRINGE (ML) INJECTION AS NEEDED
Status: DISCONTINUED | OUTPATIENT
Start: 2025-07-26 | End: 2025-07-26 | Stop reason: HOSPADM

## 2025-07-26 RX ORDER — METHYLPREDNISOLONE SODIUM SUCCINATE 125 MG/2ML
125 INJECTION, POWDER, LYOPHILIZED, FOR SOLUTION INTRAMUSCULAR; INTRAVENOUS ONCE
Status: COMPLETED | OUTPATIENT
Start: 2025-07-26 | End: 2025-07-26

## 2025-07-26 RX ADMIN — METHYLPREDNISOLONE SODIUM SUCCINATE 125 MG: 125 INJECTION, POWDER, FOR SOLUTION INTRAMUSCULAR; INTRAVENOUS at 16:32

## 2025-07-26 NOTE — Clinical Note
[facility] EMERGENCY DEPARTMENT  [address] [address]  Phone: [phone]    Mavis Byrd was seen and treated in our emergency department on 7/26/2025.  She may return to work on 07/28/2025.         Thank you for choosing [facility].    Brando Jason MD

## 2025-07-26 NOTE — ED PROVIDER NOTES
Pt Name: Mavis Byrd  MRN: [id number]  Pt :   1985  Room Number:    Date of encounter:  2025  PCP: Vanessa Galeana APRN  ED Provider: EDUARDO Matamoros    Historian: Patient    Subjective    History of Present Illness:    HPI:    Chief Complaint   Patient presents with    Elbow Pain    Post-op Problem        History of Present Illness: Mavis Byrd is a 40 y.o. female who presents to the ER complaining of right that started approximately 1 week ago.  Patient had carpal tunnel release and release of ulnar nerve at the elbow by Dr. Capellan approximately 1 week ago.  Patient states she is had this procedure on her left arm last year and did not have this much pain postoperatively.  Pain is described as Dull, Constant, and  Radiates to throughout right arm  Patient rates pain as a 8 on a ten scale.    Triage Vitals:    ED Triage Vitals [25 1545]   Temp Heart Rate Resp BP SpO2   98 °F (36.7 °C) 92 18 103/71 100 %      Temp src Heart Rate Source Patient Position BP Location FiO2 (%)   Oral Monitor -- Left arm --       Nurses Notes reviewed and agree, including vitals, allergies, social history and prior medical history.     Patient has no known allergies.    Past Medical History:   Diagnosis Date    ADHD (attention deficit hyperactivity disorder)     Depression     Hepatic fibrosis     see note [facility] -    Hepatitis C     history of; followed by GI    History of substance use     methamphetamine - last use     PTSD (post-traumatic stress disorder)     Substance abuse     Wears partial dentures     upper only       Past Surgical History:   Procedure Laterality Date    CARPAL TUNNEL RELEASE WITH CUBITAL TUNNEL RELEASE Left 2025    Procedure: Left carpal tunnel and cubital tunnel release;  Surgeon: Chema Capellan MD;  Location: [facility];  Service: Orthopedics;  Laterality: Left;    CARPAL TUNNEL RELEASE WITH CUBITAL TUNNEL RELEASE Right 2025    Procedure: RIGHT  CARPAL TUNNEL RELEASE WITH CUBITAL TUNNEL RELEASE;  Surgeon: Chema Capellan MD;  Location: [facility];  Service: Orthopedics;  Laterality: Right;       Social History     Socioeconomic History    Marital status: Single   Tobacco Use    Smoking status: Former     Current packs/day: 0.00     Types: Cigarettes     Quit date: 2024     Years since quittin.4     Passive exposure: Past    Smokeless tobacco: Never   Vaping Use    Vaping status: Every Day    Substances: Nicotine, Flavoring    Devices: Disposable    Passive vaping exposure: Yes   Substance and Sexual Activity    Alcohol use: Not Currently    Drug use: Not Currently     Types: Methamphetamines     Comment: Clean since ; currently on suboxone per Pt. (25)    Sexual activity: Defer       Family History   Problem Relation Age of Onset    Seizures Mother     Dementia Maternal Grandmother        REVIEW OF SYSTEMS:     All systems reviewed and not pertinent unless noted.    Review of Systems   Musculoskeletal:  Positive for myalgias.   All other systems reviewed and are negative.      Objective    Physical Exam  Vitals and nursing note reviewed.   Constitutional:       Appearance: Normal appearance.   HENT:      Head: Normocephalic and atraumatic.   Eyes:      Extraocular Movements: Extraocular movements intact.      Pupils: Pupils are equal, round, and reactive to light.   Cardiovascular:      Rate and Rhythm: Normal rate and regular rhythm.      Pulses: Normal pulses.      Heart sounds: Normal heart sounds.   Pulmonary:      Effort: Pulmonary effort is normal.      Breath sounds: Normal breath sounds.   Abdominal:      General: Abdomen is flat. Bowel sounds are normal.      Palpations: Abdomen is soft.   Musculoskeletal:         General: Tenderness present.      Right elbow: Decreased range of motion. Tenderness present.      Cervical back: Normal range of motion and neck supple.      Comments: Incision sites are clean dry and intact no  erythema, no signs or symptoms of infection   Skin:     Capillary Refill: Capillary refill takes less than 2 seconds.   Neurological:      General: No focal deficit present.      Mental Status: She is alert and oriented to person, place, and time. Mental status is at baseline.      GCS: GCS eye subscore is 4. GCS verbal subscore is 5. GCS motor subscore is 6.      Sensory: Sensation is intact.      Motor: Motor function is intact.      Gait: Gait is intact.   Psychiatric:         Attention and Perception: Attention and perception normal.         Mood and Affect: Mood and affect normal.         Speech: Speech normal.         Behavior: Behavior normal. Behavior is cooperative.                           Procedures    ED Course:    No orders to display            Orders placed during this visit:    Orders Placed This Encounter   Procedures    XR Elbow 3+ View Right    Comprehensive Metabolic Panel    CBC Auto Differential    Insert Peripheral IV    CBC & Differential       LAB Results:    Lab Results (last 24 hours)       Procedure Component Value Units Date/Time    CBC & Differential [[id number]]  (Abnormal) Collected: 07/26/25 1632    Specimen: Blood Updated: 07/26/25 1641    Narrative:      The following orders were created for panel order CBC & Differential.  Procedure                               Abnormality         Status                     ---------                               -----------         ------                     CBC Auto Differential[[id number]]        Abnormal            Final result                 Please view results for these tests on the individual orders.    Comprehensive Metabolic Panel [[id number]]  (Abnormal) Collected: 07/26/25 1632    Specimen: Blood Updated: 07/26/25 1656     Glucose 76 mg/dL      BUN 14.0 mg/dL      Creatinine 0.74 mg/dL      Sodium 139 mmol/L      Potassium 3.8 mmol/L      Comment: Slight hemolysis detected by analyzer. Result may be falsely elevated.        Chloride 104  mmol/L      CO2 21.7 mmol/L      Calcium 8.8 mg/dL      Total Protein 7.4 g/dL      Albumin 4.1 g/dL      ALT (SGPT) 9 U/L      AST (SGOT) 20 U/L      Alkaline Phosphatase 79 U/L      Total Bilirubin 0.3 mg/dL      Globulin 3.3 gm/dL      A/G Ratio 1.2 g/dL      BUN/Creatinine Ratio 18.9     Anion Gap 13.3 mmol/L      eGFR 105.0 mL/min/1.73     Narrative:      GFR Categories in Chronic Kidney Disease (CKD)              GFR Category          GFR (mL/min/1.73)    Interpretation  G1                    90 or greater        Normal or high (1)  G2                    60-89                Mild decrease (1)  G3a                   45-59                Mild to moderate decrease  G3b                   30-44                Moderate to severe decrease  G4                    15-29                Severe decrease  G5                    14 or less           Kidney failure    (1)In the absence of evidence of kidney disease, neither GFR category G1 or G2 fulfill the criteria for CKD.    eGFR calculation 2021 CKD-EPI creatinine equation, which does not include race as a factor    CBC Auto Differential [[id number]]  (Abnormal) Collected: 07/26/25 1632    Specimen: Blood Updated: 07/26/25 1641     WBC 4.00 10*3/mm3      RBC 3.81 10*6/mm3      Hemoglobin 11.3 g/dL      Hematocrit 33.0 %      MCV 86.6 fL      MCH 29.7 pg      MCHC 34.2 g/dL      RDW 11.6 %      RDW-SD 36.7 fl      MPV 9.8 fL      Platelets 197 10*3/mm3      Neutrophil % 58.3 %      Lymphocyte % 29.8 %      Monocyte % 9.5 %      Eosinophil % 1.8 %      Basophil % 0.3 %      Immature Grans % 0.3 %      Neutrophils, Absolute 2.34 10*3/mm3      Lymphocytes, Absolute 1.19 10*3/mm3      Monocytes, Absolute 0.38 10*3/mm3      Eosinophils, Absolute 0.07 10*3/mm3      Basophils, Absolute 0.01 10*3/mm3      Immature Grans, Absolute 0.01 10*3/mm3      nRBC 0.0 /100 WBC              If labs were ordered, I have independently reviewed the results and considered them in the diagnosis and  treatment plan for the patient    RADIOLOGY    No radiology results from the last 24 hrs     If I have ordered, I have independently reviewed the above noted radiographic studies.  Please see the radiologist dictation for the official interpretation    Medications given to patient in the ER    Medications   sodium chloride 0.9 % flush 10 mL (has no administration in time range)   methylPREDNISolone sodium succinate (SOLU-Medrol) injection 125 mg (125 mg Intravenous Given 7/26/25 1632)       AS OF 18:54 EDT VITALS:    BP - 113/68  HR - 86  TEMP - 98 °F (36.7 °C) (Oral)  O2 SATS - 99%         MEDICAL DECISION MAKING, PROGRESS, and CONSULTS    All labs, if obtained, have been independently reviewed by me.  All radiology studies, if obtained, have been reviewed by me and the radiologist dictating the report.  All EKG's, if obtained, have been independently viewed and interpreted by me      Discussion below represents my analysis of pertinent findings related to patient's condition, differential diagnosis, treatment plan and final disposition.      Differential diagnosis:    Postoperative pain, postoperative infection,    Additional Sources:  External (non-ED) record review:  Medical chart review       Orders placed during this visit:  Orders Placed This Encounter   Procedures    XR Elbow 3+ View Right    Comprehensive Metabolic Panel    CBC Auto Differential    Insert Peripheral IV    CBC & Differential         ED Course:    Consultants:  None    Mavis Byrd is a 40 y.o. female who presents to the ER complaining of right that started approximately 1 week ago.  Patient had carpal tunnel release and release of ulnar nerve at the elbow by Dr. Capellan approximately 1 week ago.  Patient states she is had this procedure on her left arm last year and did not have this much pain postoperatively.  Pain is described as Dull, Constant, and  Radiates to throughout right arm  Patient rates pain as a 8 on a ten  scale.    Physical exam the patient is grossly unremarkable.  There is some mild tenderness palpation over the right elbow.  Incisions are clean dry and intact no erythema, no signs or symptoms of infection.  Labs were obtained which showed no elevated white blood cell count, no inflammatory markers noted.  X-ray was obtained which shows no effusion or fracture.  I have discussed findings with the patient and feel the patient can be discharged with follow-up with orthopedics next week at postoperative follow-up.    AS OF 18:54 EDT VITALS:    BP - 113/68  HR - 86  TEMP - 98 °F (36.7 °C) (Oral)  O2 SATS - 99%    I reviewed the patient's prescription monitoring report if available prior to discharge    DIAGNOSIS  Final diagnoses:   Right elbow pain   Postoperative pain         DISPOSITION  ED Disposition       ED Disposition   Discharge    Condition   Stable    Comment   --                   Please note that portions of this document were completed with voice recognition software.        Sterling Braun, EDUARDO  07/26/25 1853

## 2025-07-30 ENCOUNTER — OFFICE VISIT (OUTPATIENT)
Dept: ORTHOPEDIC SURGERY | Facility: CLINIC | Age: 40
End: 2025-07-30
Payer: COMMERCIAL

## 2025-07-30 VITALS — WEIGHT: 106 LBS | TEMPERATURE: 97.1 F | HEIGHT: 59 IN | BODY MASS INDEX: 21.37 KG/M2

## 2025-07-30 DIAGNOSIS — Z98.890 S/P CUBITAL TUNNEL RELEASE: ICD-10-CM

## 2025-07-30 DIAGNOSIS — Z98.890 S/P CARPAL TUNNEL RELEASE: Primary | ICD-10-CM

## 2025-07-30 RX ORDER — IBUPROFEN 800 MG/1
800 TABLET, FILM COATED ORAL 3 TIMES DAILY
Qty: 60 TABLET | Refills: 0 | Status: SHIPPED | OUTPATIENT
Start: 2025-07-30

## 2025-07-30 RX ORDER — CEPHALEXIN 500 MG/1
500 CAPSULE ORAL 3 TIMES DAILY
Qty: 21 CAPSULE | Refills: 0 | Status: SHIPPED | OUTPATIENT
Start: 2025-07-30

## 2025-07-30 NOTE — PROGRESS NOTES
"Subjective   Patient ID: Mavis Byrd is a 40 y.o. right hand dominant female is here today for a post-operative visit.  Post-op of the Right Wrist (Carpal tunnel release 7/17/25)       CHIEF COMPLAINT:    First post-op evaluation    History of Present Illness      Patient presents 1 day sooner than her scheduled postop visit regarding right carpal tunnel and cubital tunnel release.  Patient states she is experiencing discomfort to the right wrist.  She did notice what she described as scant drainage from the wrist incision yesterday that was noticed on the bandage.  She states the elbow incision is doing relatively well.    Past Medical History:   Diagnosis Date    ADHD (attention deficit hyperactivity disorder)     Depression     Hepatic fibrosis 2023    see note [facility] -    Hepatitis C 2023    history of; followed by GI    History of substance use     methamphetamine - last use 2023    PTSD (post-traumatic stress disorder)     Substance abuse     Wears partial dentures     upper only        Past Surgical History:   Procedure Laterality Date    CARPAL TUNNEL RELEASE WITH CUBITAL TUNNEL RELEASE Left 02/13/2025    Procedure: Left carpal tunnel and cubital tunnel release;  Surgeon: Chema Capellan MD;  Location: [facility];  Service: Orthopedics;  Laterality: Left;    CARPAL TUNNEL RELEASE WITH CUBITAL TUNNEL RELEASE Right 7/17/2025    Procedure: RIGHT CARPAL TUNNEL RELEASE WITH CUBITAL TUNNEL RELEASE;  Surgeon: Chema Capellan MD;  Location: [facility];  Service: Orthopedics;  Laterality: Right;       No Known Allergies    Review of Systems   Constitutional:  Positive for fever.   Musculoskeletal:  Positive for arthralgias (right wrist carpal tunnel region).         Objective   Ht 149.9 cm (59\")   Wt 48.1 kg (106 lb)   LMP 06/16/2025   BMI 21.41 kg/m²       Signs of infection: The right wrist incision site is closed.  There was no active drainage.  There is very minimal kayla-incisional dark purple-maroon " color skin.  Mild edema to the base of the wrist.    The right elbow incision is completely closed without any redness or warmth.  Very mild bruising surrounding the right elbow incision.   Drainage: [x] no                    [] yes   Incision: [x] healing well     []healed well   Motor exam intact: [] no                    [x] yes   Neurovascular exam intact: [] no                    [x] yes   Signs of compartment syndrome: [x] no                    [] yes   Signs of DVT: [x] no                    [] yes   Other:     Patient is unable to make a full composite fist due to complaints of right wrist pain along the carpal tunnel region.  She is neurovascularly intact to all digits of the right hand.     Physical Exam  Ortho Exam    Neurological Exam    Assessment & Plan     Independent Review of Radiographic Studies:    No new imaging done today.    There was no active drainage to culture today.     Procedures     There are no diagnoses linked to this encounter.   Recommendations/Plan:     [] Staples    [x] Sutures [x] Removed today  [] At prior visit  [] Plan removal later   Physical therapy: []rehab facility  []outpatient referral  [] therapy ongoing   Ultrasound: [x]not ordered         []order given to patient   Labs: [x]not ordered         []order given to patient   Weight Bearing status: []Full []WBAT []PWB [x]NWB []Other       Discussion of orthopaedic goals and activities and patient expressed appreciation.  Regular exercise as tolerated  Guided on proper techniques for mobility and conditioning exercises  Weight bearing parameters reviewed  Take prescribed medications as instructed only as tolerated     Exercise, medications other patient advice, and return appointment as noted.    Instructed the patient on the importance of applying ice with a protective skin barrier to the right wrist 3-5 times daily for 15 to 20 minutes.  She does request a refill on ibuprofen.  I will prescribe a low-dose antibiotic to be  taken with food due to her subjective fever from the review of systems and the subjective scant drainage that she had noticed over the weekend.  Patient is content with this plan.  I would like to recheck her in 7 days or sooner if any symptoms worsen.  No follow-ups on file.  Patient is encouraged and agreeable to call or return sooner for any issues or concerns.

## 2025-08-07 ENCOUNTER — OFFICE VISIT (OUTPATIENT)
Dept: PSYCHIATRY | Facility: CLINIC | Age: 40
End: 2025-08-07
Payer: COMMERCIAL

## 2025-08-07 VITALS
HEIGHT: 59 IN | DIASTOLIC BLOOD PRESSURE: 74 MMHG | WEIGHT: 105 LBS | SYSTOLIC BLOOD PRESSURE: 110 MMHG | HEART RATE: 72 BPM | BODY MASS INDEX: 21.17 KG/M2 | OXYGEN SATURATION: 97 %

## 2025-08-07 DIAGNOSIS — F90.2 ADHD (ATTENTION DEFICIT HYPERACTIVITY DISORDER), COMBINED TYPE: ICD-10-CM

## 2025-08-07 RX ORDER — METHYLPHENIDATE HYDROCHLORIDE 80 MG/1
1 CAPSULE ORAL NIGHTLY
Qty: 30 CAPSULE | Refills: 0 | Status: SHIPPED | OUTPATIENT
Start: 2025-08-07

## 2025-08-07 RX ORDER — ONDANSETRON 4 MG/1
TABLET, ORALLY DISINTEGRATING ORAL
COMMUNITY
Start: 2025-06-19

## 2025-08-13 ENCOUNTER — OFFICE VISIT (OUTPATIENT)
Dept: ORTHOPEDIC SURGERY | Facility: CLINIC | Age: 40
End: 2025-08-13
Payer: COMMERCIAL

## 2025-08-13 VITALS — HEIGHT: 59 IN | WEIGHT: 105 LBS | BODY MASS INDEX: 21.17 KG/M2

## 2025-08-13 DIAGNOSIS — Z98.890 S/P CARPAL TUNNEL RELEASE: Primary | ICD-10-CM

## 2025-08-13 DIAGNOSIS — T81.49XA INFECTED INCISION: ICD-10-CM

## 2025-08-13 PROCEDURE — 87186 SC STD MICRODIL/AGAR DIL: CPT | Performed by: PHYSICIAN ASSISTANT

## 2025-08-13 PROCEDURE — 87205 SMEAR GRAM STAIN: CPT | Performed by: PHYSICIAN ASSISTANT

## 2025-08-13 PROCEDURE — 87147 CULTURE TYPE IMMUNOLOGIC: CPT | Performed by: PHYSICIAN ASSISTANT

## 2025-08-13 PROCEDURE — 87070 CULTURE OTHR SPECIMN AEROBIC: CPT | Performed by: PHYSICIAN ASSISTANT

## 2025-08-13 RX ORDER — SULFAMETHOXAZOLE AND TRIMETHOPRIM 800; 160 MG/1; MG/1
1 TABLET ORAL 2 TIMES DAILY
Qty: 60 TABLET | Refills: 0 | Status: SHIPPED | OUTPATIENT
Start: 2025-08-13 | End: 2025-08-13

## 2025-08-13 RX ORDER — SULFAMETHOXAZOLE AND TRIMETHOPRIM 800; 160 MG/1; MG/1
1 TABLET ORAL 2 TIMES DAILY
Qty: 14 TABLET | Refills: 0 | Status: SHIPPED | OUTPATIENT
Start: 2025-08-13

## 2025-08-16 LAB
BACTERIA SPEC AEROBE CULT: ABNORMAL
GRAM STN SPEC: ABNORMAL

## 2025-08-18 ENCOUNTER — RESULTS FOLLOW-UP (OUTPATIENT)
Dept: ORTHOPEDIC SURGERY | Facility: CLINIC | Age: 40
End: 2025-08-18
Payer: COMMERCIAL

## 2025-08-20 ENCOUNTER — OFFICE VISIT (OUTPATIENT)
Dept: ORTHOPEDIC SURGERY | Facility: CLINIC | Age: 40
End: 2025-08-20
Payer: COMMERCIAL

## 2025-08-20 VITALS
HEIGHT: 59 IN | SYSTOLIC BLOOD PRESSURE: 110 MMHG | DIASTOLIC BLOOD PRESSURE: 70 MMHG | WEIGHT: 105 LBS | BODY MASS INDEX: 21.17 KG/M2

## 2025-08-20 DIAGNOSIS — T81.49XA INFECTED INCISION: ICD-10-CM

## 2025-08-20 DIAGNOSIS — Z98.890 S/P CARPAL TUNNEL RELEASE: Primary | ICD-10-CM

## 2025-08-20 RX ORDER — IBUPROFEN 800 MG/1
800 TABLET, FILM COATED ORAL 3 TIMES DAILY
Qty: 30 TABLET | Refills: 0 | Status: SHIPPED | OUTPATIENT
Start: 2025-08-20

## 2025-08-20 RX ORDER — SULFAMETHOXAZOLE AND TRIMETHOPRIM 800; 160 MG/1; MG/1
1 TABLET ORAL 2 TIMES DAILY
Qty: 10 TABLET | Refills: 0 | Status: SHIPPED | OUTPATIENT
Start: 2025-08-20

## (undated) DEVICE — SUT ETHLN 3/0 FS1 663G

## (undated) DEVICE — SOL IRR NACL 0.9PCT BO 1000ML

## (undated) DEVICE — BNDG ELAS MATRX V/CLS 4IN 5YD LF

## (undated) DEVICE — DRESSING,GAUZE,XEROFORM,CURAD,1"X8",ST: Brand: CURAD

## (undated) DEVICE — ANTIBACTERIAL UNDYED BRAIDED (POLYGLACTIN 910), SYNTHETIC ABSORBABLE SUTURE: Brand: COATED VICRYL

## (undated) DEVICE — ADHS LIQ MASTISOL 2/3ML

## (undated) DEVICE — VESSEL LOOPS X-RAY DETECTABLE: Brand: DEROYAL

## (undated) DEVICE — LP VESL MAXI 2.5X1MM RED 2PK

## (undated) DEVICE — GLV SURG SENSICARE PI ORTHO SZ8 LF STRL

## (undated) DEVICE — SUT VIC 3/0 SH 27IN J416H

## (undated) DEVICE — PK EXTRM UPPR 20

## (undated) DEVICE — ELECTRD NDL EZ CLN MOD 2.75IN

## (undated) DEVICE — GLOVE,SURG,SENSICARE SLT,LF,PF,8: Brand: MEDLINE

## (undated) DEVICE — STRIP,CLOSURE,WOUND,MEDI-STRIP,1/2X4: Brand: MEDLINE

## (undated) DEVICE — SLV SCD CALF HEMOFORCE DVT THERP REPROC MD

## (undated) DEVICE — SUT VIC 4/0 SH 27IN J415H

## (undated) DEVICE — DRSNG SURG AQUACEL AG/ADVNTGE 9X15CM 3.5X6IN

## (undated) DEVICE — GLV SURG SENSICARE ORTHO PF LF 8 STRL

## (undated) DEVICE — DRSNG SURESITE WNDW 4X4.5

## (undated) DEVICE — PATIENT RETURN ELECTRODE, SINGLE-USE, CONTACT QUALITY MONITORING, ADULT, WITH 9FT CORD, FOR PATIENTS WEIGING OVER 33LBS. (15KG): Brand: MEGADYNE

## (undated) DEVICE — FLEXIBLE YANKAUER,MEDIUM TIP, NO VACUUM CONTROL: Brand: ARGYLE

## (undated) DEVICE — DISPOSABLE TOURNIQUET CUFF SINGLE BLADDER, SINGLE PORT AND QUICK CONNECT CONNECTOR: Brand: COLOR CUFF

## (undated) DEVICE — BLD CLIP FLT UNIV DISP GRY

## (undated) DEVICE — ANTIBACTERIAL VIOLET BRAIDED (POLYGLACTIN 910), SYNTHETIC ABSORBABLE SUTURE: Brand: COATED VICRYL